# Patient Record
Sex: FEMALE | Race: BLACK OR AFRICAN AMERICAN | URBAN - METROPOLITAN AREA
[De-identification: names, ages, dates, MRNs, and addresses within clinical notes are randomized per-mention and may not be internally consistent; named-entity substitution may affect disease eponyms.]

---

## 2018-11-01 ENCOUNTER — APPOINTMENT (OUTPATIENT)
Dept: OTHER | Facility: HOSPITAL | Age: 43
End: 2018-11-01
Attending: FAMILY MEDICINE

## 2022-03-03 ENCOUNTER — APPOINTMENT (OUTPATIENT)
Dept: GENERAL RADIOLOGY | Facility: HOSPITAL | Age: 47
End: 2022-03-03
Attending: EMERGENCY MEDICINE
Payer: COMMERCIAL

## 2022-03-03 ENCOUNTER — HOSPITAL ENCOUNTER (EMERGENCY)
Facility: HOSPITAL | Age: 47
Discharge: HOME | End: 2022-03-03
Attending: EMERGENCY MEDICINE
Payer: COMMERCIAL

## 2022-03-03 VITALS
TEMPERATURE: 98 F | DIASTOLIC BLOOD PRESSURE: 87 MMHG | OXYGEN SATURATION: 95 % | SYSTOLIC BLOOD PRESSURE: 102 MMHG | WEIGHT: 220 LBS | RESPIRATION RATE: 18 BRPM | HEART RATE: 85 BPM

## 2022-03-03 DIAGNOSIS — J40 BRONCHITIS: Primary | ICD-10-CM

## 2022-03-03 LAB
ANION GAP SERPL CALC-SCNC: 8 MMOL/L (ref 0–18)
BASOPHILS # BLD AUTO: 0.09 X10(3) UL (ref 0–0.2)
BASOPHILS NFR BLD AUTO: 0.7 %
BUN BLD-MCNC: 9 MG/DL (ref 7–18)
BUN/CREAT SERPL: 10.8 (ref 10–20)
CALCIUM BLD-MCNC: 9 MG/DL (ref 8.5–10.1)
CHLORIDE SERPL-SCNC: 110 MMOL/L (ref 98–112)
CO2 SERPL-SCNC: 25 MMOL/L (ref 21–32)
CREAT BLD-MCNC: 0.83 MG/DL
D DIMER PPP FEU-MCNC: 0.48 UG/ML FEU (ref ?–0.5)
DEPRECATED RDW RBC AUTO: 40.4 FL (ref 35.1–46.3)
EOSINOPHIL # BLD AUTO: 0.07 X10(3) UL (ref 0–0.7)
EOSINOPHIL NFR BLD AUTO: 0.5 %
ERYTHROCYTE [DISTWIDTH] IN BLOOD BY AUTOMATED COUNT: 11.9 % (ref 11–15)
GLUCOSE BLD-MCNC: 100 MG/DL (ref 70–99)
HCT VFR BLD AUTO: 38.4 %
HGB BLD-MCNC: 12.8 G/DL
IMM GRANULOCYTES # BLD AUTO: 0.05 X10(3) UL (ref 0–1)
IMM GRANULOCYTES NFR BLD: 0.4 %
LYMPHOCYTES # BLD AUTO: 1.66 X10(3) UL (ref 1–4)
LYMPHOCYTES NFR BLD AUTO: 13 %
MCH RBC QN AUTO: 31.1 PG (ref 26–34)
MCHC RBC AUTO-ENTMCNC: 33.3 G/DL (ref 31–37)
MCV RBC AUTO: 93.2 FL
MONOCYTES # BLD AUTO: 1.18 X10(3) UL (ref 0.1–1)
MONOCYTES NFR BLD AUTO: 9.2 %
NEUTROPHILS # BLD AUTO: 9.73 X10 (3) UL (ref 1.5–7.7)
NEUTROPHILS # BLD AUTO: 9.73 X10(3) UL (ref 1.5–7.7)
NEUTROPHILS NFR BLD AUTO: 76.2 %
OSMOLALITY SERPL CALC.SUM OF ELEC: 295 MOSM/KG (ref 275–295)
PLATELET # BLD AUTO: 195 10(3)UL (ref 150–450)
POTASSIUM SERPL-SCNC: 3.9 MMOL/L (ref 3.5–5.1)
RBC # BLD AUTO: 4.12 X10(6)UL
SODIUM SERPL-SCNC: 143 MMOL/L (ref 136–145)
TROPONIN I HIGH SENSITIVITY: 5 NG/L
WBC # BLD AUTO: 12.8 X10(3) UL (ref 4–11)

## 2022-03-03 PROCEDURE — 99284 EMERGENCY DEPT VISIT MOD MDM: CPT

## 2022-03-03 PROCEDURE — 96374 THER/PROPH/DIAG INJ IV PUSH: CPT

## 2022-03-03 PROCEDURE — 80048 BASIC METABOLIC PNL TOTAL CA: CPT | Performed by: EMERGENCY MEDICINE

## 2022-03-03 PROCEDURE — 93005 ELECTROCARDIOGRAM TRACING: CPT

## 2022-03-03 PROCEDURE — 85025 COMPLETE CBC W/AUTO DIFF WBC: CPT | Performed by: EMERGENCY MEDICINE

## 2022-03-03 PROCEDURE — 71045 X-RAY EXAM CHEST 1 VIEW: CPT | Performed by: EMERGENCY MEDICINE

## 2022-03-03 PROCEDURE — 93010 ELECTROCARDIOGRAM REPORT: CPT | Performed by: EMERGENCY MEDICINE

## 2022-03-03 PROCEDURE — 85379 FIBRIN DEGRADATION QUANT: CPT | Performed by: EMERGENCY MEDICINE

## 2022-03-03 PROCEDURE — 94640 AIRWAY INHALATION TREATMENT: CPT

## 2022-03-03 PROCEDURE — 84484 ASSAY OF TROPONIN QUANT: CPT | Performed by: EMERGENCY MEDICINE

## 2022-03-03 RX ORDER — ALBUTEROL SULFATE 2.5 MG/3ML
SOLUTION RESPIRATORY (INHALATION)
Status: COMPLETED
Start: 2022-03-03 | End: 2022-03-03

## 2022-03-03 RX ORDER — ALBUTEROL SULFATE 90 UG/1
2 AEROSOL, METERED RESPIRATORY (INHALATION) 4 TIMES DAILY
Status: DISCONTINUED | OUTPATIENT
Start: 2022-03-03 | End: 2022-03-03

## 2022-03-03 RX ORDER — METHYLPREDNISOLONE SODIUM SUCCINATE 125 MG/2ML
60 INJECTION, POWDER, LYOPHILIZED, FOR SOLUTION INTRAMUSCULAR; INTRAVENOUS ONCE
Status: COMPLETED | OUTPATIENT
Start: 2022-03-03 | End: 2022-03-03

## 2022-03-03 RX ORDER — DOXYCYCLINE HYCLATE 100 MG/1
100 CAPSULE ORAL 2 TIMES DAILY
Qty: 10 CAPSULE | Refills: 0 | Status: SHIPPED | OUTPATIENT
Start: 2022-03-03 | End: 2022-03-08

## 2022-03-03 RX ORDER — PREDNISONE 20 MG/1
40 TABLET ORAL DAILY
Qty: 6 TABLET | Refills: 0 | Status: SHIPPED | OUTPATIENT
Start: 2022-03-03 | End: 2022-03-06

## 2022-03-03 RX ORDER — ALBUTEROL SULFATE 2.5 MG/3ML
2.5 SOLUTION RESPIRATORY (INHALATION) ONCE
Status: COMPLETED | OUTPATIENT
Start: 2022-03-03 | End: 2022-03-03

## 2022-03-03 RX ORDER — ALBUTEROL SULFATE 90 UG/1
2 AEROSOL, METERED RESPIRATORY (INHALATION) EVERY 4 HOURS PRN
Qty: 1 EACH | Refills: 0 | Status: SHIPPED | OUTPATIENT
Start: 2022-03-03 | End: 2022-04-02

## 2022-03-03 NOTE — ED QUICK NOTES
Pt A&OX4, pt denies dizziness/lightheadedness, cp, sob, n/v. Discharge paperwork and prescriptions discussed with pt, pt verbally understands them. Pt discharged ambulatory with steady gait - no distress noted.

## 2022-08-01 ENCOUNTER — OFFICE VISIT (OUTPATIENT)
Dept: FAMILY MEDICINE CLINIC | Facility: CLINIC | Age: 47
End: 2022-08-01
Payer: COMMERCIAL

## 2022-08-01 VITALS
HEIGHT: 64 IN | DIASTOLIC BLOOD PRESSURE: 77 MMHG | HEART RATE: 91 BPM | WEIGHT: 235 LBS | BODY MASS INDEX: 40.12 KG/M2 | SYSTOLIC BLOOD PRESSURE: 115 MMHG

## 2022-08-01 DIAGNOSIS — Z82.69 FAMILY HISTORY OF SYSTEMIC LUPUS ERYTHEMATOSUS: ICD-10-CM

## 2022-08-01 DIAGNOSIS — N63.22 MASS OF UPPER INNER QUADRANT OF LEFT BREAST: ICD-10-CM

## 2022-08-01 DIAGNOSIS — E07.89 THYROID MASS OF UNCLEAR ETIOLOGY: ICD-10-CM

## 2022-08-01 DIAGNOSIS — Z12.31 ENCOUNTER FOR SCREENING MAMMOGRAM FOR MALIGNANT NEOPLASM OF BREAST: ICD-10-CM

## 2022-08-01 DIAGNOSIS — J45.20 MILD INTERMITTENT ASTHMA, UNSPECIFIED WHETHER COMPLICATED: Primary | ICD-10-CM

## 2022-08-01 DIAGNOSIS — R63.5 WEIGHT GAIN: ICD-10-CM

## 2022-08-01 DIAGNOSIS — E66.01 CLASS 3 SEVERE OBESITY WITHOUT SERIOUS COMORBIDITY WITH BODY MASS INDEX (BMI) OF 40.0 TO 44.9 IN ADULT, UNSPECIFIED OBESITY TYPE (HCC): ICD-10-CM

## 2022-08-01 PROBLEM — E66.813 CLASS 3 SEVERE OBESITY WITHOUT SERIOUS COMORBIDITY WITH BODY MASS INDEX (BMI) OF 40.0 TO 44.9 IN ADULT: Status: ACTIVE | Noted: 2022-08-01

## 2022-08-01 PROBLEM — E66.813 CLASS 3 SEVERE OBESITY WITHOUT SERIOUS COMORBIDITY WITH BODY MASS INDEX (BMI) OF 40.0 TO 44.9 IN ADULT (HCC): Status: ACTIVE | Noted: 2022-08-01

## 2022-08-01 PROCEDURE — 3008F BODY MASS INDEX DOCD: CPT

## 2022-08-01 PROCEDURE — 3078F DIAST BP <80 MM HG: CPT

## 2022-08-01 PROCEDURE — 3074F SYST BP LT 130 MM HG: CPT

## 2022-08-01 PROCEDURE — 99203 OFFICE O/P NEW LOW 30 MIN: CPT

## 2022-08-01 RX ORDER — ALBUTEROL SULFATE 90 UG/1
2 AEROSOL, METERED RESPIRATORY (INHALATION) EVERY 4 HOURS PRN
Qty: 18 G | Refills: 2 | Status: SHIPPED | OUTPATIENT
Start: 2022-08-01

## 2022-08-01 RX ORDER — BUDESONIDE AND FORMOTEROL FUMARATE DIHYDRATE 160; 4.5 UG/1; UG/1
2 AEROSOL RESPIRATORY (INHALATION) 2 TIMES DAILY
COMMUNITY
Start: 2022-03-03 | End: 2022-08-01

## 2022-08-01 RX ORDER — SODIUM FLUORIDE 5 MG/ML
PASTE, DENTIFRICE DENTAL
COMMUNITY
Start: 2022-02-05

## 2022-08-01 RX ORDER — MONTELUKAST SODIUM 10 MG/1
10 TABLET ORAL DAILY
Qty: 90 TABLET | Refills: 0 | Status: SHIPPED | OUTPATIENT
Start: 2022-08-01

## 2022-08-01 RX ORDER — CYCLOBENZAPRINE HCL 10 MG
10 TABLET ORAL 3 TIMES DAILY
COMMUNITY
Start: 2022-03-11 | End: 2022-08-01

## 2022-08-01 RX ORDER — MONTELUKAST SODIUM 10 MG/1
10 TABLET ORAL DAILY
COMMUNITY
Start: 2022-03-06 | End: 2022-08-01

## 2022-08-01 RX ORDER — SUMATRIPTAN 50 MG/1
TABLET, FILM COATED ORAL
COMMUNITY
Start: 2022-03-11

## 2022-08-01 RX ORDER — CHLORHEXIDINE GLUCONATE 0.12 MG/ML
RINSE ORAL
COMMUNITY
Start: 2022-02-05

## 2022-08-01 RX ORDER — CYCLOBENZAPRINE HCL 10 MG
10 TABLET ORAL 3 TIMES DAILY
Qty: 42 TABLET | Refills: 0 | Status: SHIPPED | OUTPATIENT
Start: 2022-08-01

## 2022-08-01 RX ORDER — BUDESONIDE AND FORMOTEROL FUMARATE DIHYDRATE 160; 4.5 UG/1; UG/1
2 AEROSOL RESPIRATORY (INHALATION) 2 TIMES DAILY
Qty: 10.2 G | Refills: 2 | Status: SHIPPED | OUTPATIENT
Start: 2022-08-01

## 2022-08-01 RX ORDER — PREDNISONE 20 MG/1
TABLET ORAL
Qty: 12 TABLET | Refills: 0 | Status: SHIPPED | OUTPATIENT
Start: 2022-08-01

## 2022-08-03 ENCOUNTER — PATIENT MESSAGE (OUTPATIENT)
Dept: FAMILY MEDICINE CLINIC | Facility: CLINIC | Age: 47
End: 2022-08-03

## 2022-08-03 PROBLEM — E07.89 THYROID MASS OF UNCLEAR ETIOLOGY: Status: ACTIVE | Noted: 2022-08-03

## 2022-08-04 ENCOUNTER — LAB ENCOUNTER (OUTPATIENT)
Dept: LAB | Age: 47
End: 2022-08-04
Payer: COMMERCIAL

## 2022-08-04 DIAGNOSIS — Z82.69 FAMILY HISTORY OF SYSTEMIC LUPUS ERYTHEMATOSUS: ICD-10-CM

## 2022-08-04 DIAGNOSIS — E66.01 CLASS 3 SEVERE OBESITY WITHOUT SERIOUS COMORBIDITY WITH BODY MASS INDEX (BMI) OF 40.0 TO 44.9 IN ADULT, UNSPECIFIED OBESITY TYPE (HCC): ICD-10-CM

## 2022-08-04 LAB
ALBUMIN SERPL-MCNC: 3.4 G/DL (ref 3.4–5)
ALBUMIN/GLOB SERPL: 0.8 {RATIO} (ref 1–2)
ALP LIVER SERPL-CCNC: 68 U/L
ALT SERPL-CCNC: 18 U/L
ANION GAP SERPL CALC-SCNC: 9 MMOL/L (ref 0–18)
AST SERPL-CCNC: 9 U/L (ref 15–37)
BASOPHILS # BLD AUTO: 0.1 X10(3) UL (ref 0–0.2)
BASOPHILS NFR BLD AUTO: 1.4 %
BILIRUB SERPL-MCNC: 0.4 MG/DL (ref 0.1–2)
BUN BLD-MCNC: 10 MG/DL (ref 7–18)
CALCIUM BLD-MCNC: 9 MG/DL (ref 8.5–10.1)
CHLORIDE SERPL-SCNC: 113 MMOL/L (ref 98–112)
CHOLEST SERPL-MCNC: 142 MG/DL (ref ?–200)
CO2 SERPL-SCNC: 20 MMOL/L (ref 21–32)
CREAT BLD-MCNC: 0.88 MG/DL
EOSINOPHIL # BLD AUTO: 0.19 X10(3) UL (ref 0–0.7)
EOSINOPHIL NFR BLD AUTO: 2.6 %
ERYTHROCYTE [DISTWIDTH] IN BLOOD BY AUTOMATED COUNT: 12.3 %
GFR SERPLBLD BASED ON 1.73 SQ M-ARVRAT: 82 ML/MIN/1.73M2 (ref 60–?)
GLOBULIN PLAS-MCNC: 4.3 G/DL (ref 2.8–4.4)
GLUCOSE BLD-MCNC: 102 MG/DL (ref 70–99)
HCT VFR BLD AUTO: 38.8 %
HDLC SERPL-MCNC: 49 MG/DL (ref 40–59)
HGB BLD-MCNC: 12.4 G/DL
IMM GRANULOCYTES # BLD AUTO: 0.04 X10(3) UL (ref 0–1)
IMM GRANULOCYTES NFR BLD: 0.6 %
LDLC SERPL CALC-MCNC: 82 MG/DL (ref ?–100)
LYMPHOCYTES # BLD AUTO: 2.19 X10(3) UL (ref 1–4)
LYMPHOCYTES NFR BLD AUTO: 30.2 %
MCH RBC QN AUTO: 29.5 PG (ref 26–34)
MCHC RBC AUTO-ENTMCNC: 32 G/DL (ref 31–37)
MCV RBC AUTO: 92.4 FL
MONOCYTES # BLD AUTO: 0.8 X10(3) UL (ref 0.1–1)
MONOCYTES NFR BLD AUTO: 11 %
NEUTROPHILS # BLD AUTO: 3.94 X10 (3) UL (ref 1.5–7.7)
NEUTROPHILS # BLD AUTO: 3.94 X10(3) UL (ref 1.5–7.7)
NEUTROPHILS NFR BLD AUTO: 54.2 %
NONHDLC SERPL-MCNC: 93 MG/DL (ref ?–130)
OSMOLALITY SERPL CALC.SUM OF ELEC: 293 MOSM/KG (ref 275–295)
PLATELET # BLD AUTO: 216 10(3)UL (ref 150–450)
POTASSIUM SERPL-SCNC: 4 MMOL/L (ref 3.5–5.1)
PROT SERPL-MCNC: 7.7 G/DL (ref 6.4–8.2)
RBC # BLD AUTO: 4.2 X10(6)UL
SODIUM SERPL-SCNC: 142 MMOL/L (ref 136–145)
TRIGL SERPL-MCNC: 47 MG/DL (ref 30–149)
TSI SER-ACNC: 0.52 MIU/ML (ref 0.36–3.74)
VLDLC SERPL CALC-MCNC: 7 MG/DL (ref 0–30)
WBC # BLD AUTO: 7.3 X10(3) UL (ref 4–11)

## 2022-08-04 PROCEDURE — 84443 ASSAY THYROID STIM HORMONE: CPT

## 2022-08-04 PROCEDURE — 80061 LIPID PANEL: CPT

## 2022-08-04 PROCEDURE — 86038 ANTINUCLEAR ANTIBODIES: CPT

## 2022-08-04 PROCEDURE — 80053 COMPREHEN METABOLIC PANEL: CPT

## 2022-08-04 PROCEDURE — 85025 COMPLETE CBC W/AUTO DIFF WBC: CPT

## 2022-08-04 PROCEDURE — 36415 COLL VENOUS BLD VENIPUNCTURE: CPT

## 2022-08-10 PROBLEM — R94.8 ABNORMAL METABOLIC FUNCTION: Status: ACTIVE | Noted: 2022-08-10

## 2022-08-10 PROBLEM — R73.9 HIGH BLOOD SUGAR: Status: ACTIVE | Noted: 2022-08-10

## 2022-08-15 ENCOUNTER — LAB ENCOUNTER (OUTPATIENT)
Dept: LAB | Age: 47
End: 2022-08-15
Payer: COMMERCIAL

## 2022-08-15 ENCOUNTER — HOSPITAL ENCOUNTER (OUTPATIENT)
Dept: ULTRASOUND IMAGING | Age: 47
Discharge: HOME | End: 2022-08-15
Payer: COMMERCIAL

## 2022-08-15 DIAGNOSIS — R94.8 ABNORMAL METABOLIC FUNCTION: ICD-10-CM

## 2022-08-15 DIAGNOSIS — R73.9 HIGH BLOOD SUGAR: ICD-10-CM

## 2022-08-15 DIAGNOSIS — E07.89 THYROID MASS OF UNCLEAR ETIOLOGY: ICD-10-CM

## 2022-08-15 LAB
ALBUMIN SERPL-MCNC: 3.5 G/DL (ref 3.4–5)
ALBUMIN/GLOB SERPL: 0.8 {RATIO} (ref 1–2)
ALP LIVER SERPL-CCNC: 67 U/L
ALT SERPL-CCNC: 19 U/L
ANION GAP SERPL CALC-SCNC: 3 MMOL/L (ref 0–18)
AST SERPL-CCNC: 11 U/L (ref 15–37)
BILIRUB SERPL-MCNC: 0.3 MG/DL (ref 0.1–2)
BUN BLD-MCNC: 15 MG/DL (ref 7–18)
CALCIUM BLD-MCNC: 8.5 MG/DL (ref 8.5–10.1)
CHLORIDE SERPL-SCNC: 111 MMOL/L (ref 98–112)
CO2 SERPL-SCNC: 25 MMOL/L (ref 21–32)
CREAT BLD-MCNC: 1.05 MG/DL
EST. AVERAGE GLUCOSE BLD GHB EST-MCNC: 111 MG/DL (ref 68–126)
FASTING STATUS PATIENT QL REPORTED: YES
GFR SERPLBLD BASED ON 1.73 SQ M-ARVRAT: 66 ML/MIN/1.73M2 (ref 60–?)
GLOBULIN PLAS-MCNC: 4.2 G/DL (ref 2.8–4.4)
GLUCOSE BLD-MCNC: 90 MG/DL (ref 70–99)
HBA1C MFR BLD: 5.5 % (ref ?–5.7)
OSMOLALITY SERPL CALC.SUM OF ELEC: 288 MOSM/KG (ref 275–295)
POTASSIUM SERPL-SCNC: 3.4 MMOL/L (ref 3.5–5.1)
PROT SERPL-MCNC: 7.7 G/DL (ref 6.4–8.2)
SODIUM SERPL-SCNC: 139 MMOL/L (ref 136–145)

## 2022-08-15 PROCEDURE — 83036 HEMOGLOBIN GLYCOSYLATED A1C: CPT

## 2022-08-15 PROCEDURE — 36415 COLL VENOUS BLD VENIPUNCTURE: CPT

## 2022-08-15 PROCEDURE — 80053 COMPREHEN METABOLIC PANEL: CPT

## 2022-08-15 PROCEDURE — 76536 US EXAM OF HEAD AND NECK: CPT

## 2022-08-16 ENCOUNTER — HOSPITAL ENCOUNTER (OUTPATIENT)
Dept: ULTRASOUND IMAGING | Age: 47
Discharge: HOME | End: 2022-08-16
Payer: COMMERCIAL

## 2022-08-16 ENCOUNTER — HOSPITAL ENCOUNTER (OUTPATIENT)
Dept: MAMMOGRAPHY | Age: 47
Discharge: HOME | End: 2022-08-16
Payer: COMMERCIAL

## 2022-08-16 DIAGNOSIS — Z12.31 ENCOUNTER FOR SCREENING MAMMOGRAM FOR MALIGNANT NEOPLASM OF BREAST: ICD-10-CM

## 2022-08-16 DIAGNOSIS — N63.22 MASS OF UPPER INNER QUADRANT OF LEFT BREAST: ICD-10-CM

## 2022-08-16 PROCEDURE — 77066 DX MAMMO INCL CAD BI: CPT

## 2022-08-16 PROCEDURE — 76641 ULTRASOUND BREAST COMPLETE: CPT

## 2022-08-16 PROCEDURE — 77062 BREAST TOMOSYNTHESIS BI: CPT

## 2022-08-19 ENCOUNTER — LAB ENCOUNTER (OUTPATIENT)
Dept: LAB | Age: 47
End: 2022-08-19
Payer: COMMERCIAL

## 2022-08-19 ENCOUNTER — OFFICE VISIT (OUTPATIENT)
Dept: FAMILY MEDICINE CLINIC | Facility: CLINIC | Age: 47
End: 2022-08-19
Payer: COMMERCIAL

## 2022-08-19 VITALS
TEMPERATURE: 98 F | SYSTOLIC BLOOD PRESSURE: 114 MMHG | HEIGHT: 64 IN | BODY MASS INDEX: 39.95 KG/M2 | RESPIRATION RATE: 18 BRPM | OXYGEN SATURATION: 97 % | WEIGHT: 234 LBS | HEART RATE: 88 BPM | DIASTOLIC BLOOD PRESSURE: 75 MMHG

## 2022-08-19 DIAGNOSIS — J45.20 MILD INTERMITTENT ASTHMA, UNSPECIFIED WHETHER COMPLICATED: Primary | ICD-10-CM

## 2022-08-19 DIAGNOSIS — J02.9 SORE THROAT: ICD-10-CM

## 2022-08-19 DIAGNOSIS — E06.9 THYROIDITIS: ICD-10-CM

## 2022-08-19 DIAGNOSIS — Z30.42 ENCOUNTER FOR DEPO-PROVERA CONTRACEPTION: ICD-10-CM

## 2022-08-19 LAB
CONTROL LINE PRESENT WITH A CLEAR BACKGROUND (YES/NO): YES YES/NO
PREGNANCY TEST, URINE: NEGATIVE
THYROPEROXIDASE AB SERPL-ACNC: 29 U/ML (ref ?–60)

## 2022-08-19 PROCEDURE — 36415 COLL VENOUS BLD VENIPUNCTURE: CPT

## 2022-08-19 PROCEDURE — 3078F DIAST BP <80 MM HG: CPT

## 2022-08-19 PROCEDURE — 3008F BODY MASS INDEX DOCD: CPT

## 2022-08-19 PROCEDURE — 3074F SYST BP LT 130 MM HG: CPT

## 2022-08-19 PROCEDURE — 96372 THER/PROPH/DIAG INJ SC/IM: CPT

## 2022-08-19 PROCEDURE — 86376 MICROSOMAL ANTIBODY EACH: CPT

## 2022-08-19 PROCEDURE — 99213 OFFICE O/P EST LOW 20 MIN: CPT

## 2022-08-19 PROCEDURE — 81025 URINE PREGNANCY TEST: CPT

## 2022-08-19 RX ORDER — PREDNISONE 20 MG/1
40 TABLET ORAL DAILY
Qty: 6 TABLET | Refills: 0 | Status: SHIPPED | OUTPATIENT
Start: 2022-08-19 | End: 2022-08-22

## 2022-08-19 NOTE — PATIENT INSTRUCTIONS
Take Allegra or Zyrtec daily   Take Prednisone 20 mg two tablets daily x 3 days.  Call office once done if breathing not improved  Wait to take chest x-ray on Monday if breathing not improving     Return to office in 3 months for second Depo- Provera injection

## 2022-09-26 ENCOUNTER — OFFICE VISIT (OUTPATIENT)
Dept: ENDOCRINOLOGY CLINIC | Facility: CLINIC | Age: 47
End: 2022-09-26

## 2022-09-26 VITALS
BODY MASS INDEX: 38.07 KG/M2 | HEIGHT: 64 IN | WEIGHT: 223 LBS | DIASTOLIC BLOOD PRESSURE: 67 MMHG | HEART RATE: 90 BPM | RESPIRATION RATE: 16 BRPM | SYSTOLIC BLOOD PRESSURE: 124 MMHG

## 2022-09-26 DIAGNOSIS — E04.9 GOITER: Primary | ICD-10-CM

## 2022-09-26 PROCEDURE — 3074F SYST BP LT 130 MM HG: CPT | Performed by: INTERNAL MEDICINE

## 2022-09-26 PROCEDURE — 3008F BODY MASS INDEX DOCD: CPT | Performed by: INTERNAL MEDICINE

## 2022-09-26 PROCEDURE — 3078F DIAST BP <80 MM HG: CPT | Performed by: INTERNAL MEDICINE

## 2022-09-26 PROCEDURE — 99244 OFF/OP CNSLTJ NEW/EST MOD 40: CPT | Performed by: INTERNAL MEDICINE

## 2022-09-27 PROBLEM — E04.9 GOITER: Status: ACTIVE | Noted: 2022-09-27

## 2022-11-05 ENCOUNTER — OFFICE VISIT (OUTPATIENT)
Dept: FAMILY MEDICINE CLINIC | Facility: CLINIC | Age: 47
End: 2022-11-05
Payer: COMMERCIAL

## 2022-11-05 VITALS
SYSTOLIC BLOOD PRESSURE: 109 MMHG | RESPIRATION RATE: 18 BRPM | HEIGHT: 64 IN | DIASTOLIC BLOOD PRESSURE: 72 MMHG | WEIGHT: 224 LBS | OXYGEN SATURATION: 98 % | TEMPERATURE: 98 F | HEART RATE: 76 BPM | BODY MASS INDEX: 38.24 KG/M2

## 2022-11-05 DIAGNOSIS — J45.20 MILD INTERMITTENT ASTHMA, UNSPECIFIED WHETHER COMPLICATED: ICD-10-CM

## 2022-11-05 DIAGNOSIS — Z30.42 ENCOUNTER FOR DEPO-PROVERA CONTRACEPTION: Primary | ICD-10-CM

## 2022-11-05 DIAGNOSIS — K04.7 TOOTH INFECTION: ICD-10-CM

## 2022-11-05 PROBLEM — E66.9 OBESITY (BMI 30-39.9): Status: ACTIVE | Noted: 2018-08-15

## 2022-11-05 LAB
CONTROL LINE PRESENT WITH A CLEAR BACKGROUND (YES/NO): YES YES/NO
KIT LOT #: NORMAL NUMERIC
PREGNANCY TEST, URINE: NEGATIVE

## 2022-11-05 PROCEDURE — 3078F DIAST BP <80 MM HG: CPT

## 2022-11-05 PROCEDURE — 96372 THER/PROPH/DIAG INJ SC/IM: CPT

## 2022-11-05 PROCEDURE — 3008F BODY MASS INDEX DOCD: CPT

## 2022-11-05 PROCEDURE — 99213 OFFICE O/P EST LOW 20 MIN: CPT

## 2022-11-05 PROCEDURE — 81025 URINE PREGNANCY TEST: CPT

## 2022-11-05 PROCEDURE — 3074F SYST BP LT 130 MM HG: CPT

## 2022-11-05 RX ORDER — AMOXICILLIN 500 MG/1
500 CAPSULE ORAL 2 TIMES DAILY
Qty: 20 CAPSULE | Refills: 0 | Status: SHIPPED | OUTPATIENT
Start: 2022-11-05 | End: 2022-11-15

## 2022-11-05 RX ORDER — MEDROXYPROGESTERONE ACETATE 150 MG/ML
150 INJECTION, SUSPENSION INTRAMUSCULAR ONCE
Status: COMPLETED | OUTPATIENT
Start: 2022-11-05 | End: 2022-11-05

## 2022-11-05 RX ORDER — BUDESONIDE AND FORMOTEROL FUMARATE DIHYDRATE 160; 4.5 UG/1; UG/1
2 AEROSOL RESPIRATORY (INHALATION) 2 TIMES DAILY
Qty: 10.2 G | Refills: 2 | Status: SHIPPED | OUTPATIENT
Start: 2022-11-05

## 2022-11-05 RX ADMIN — MEDROXYPROGESTERONE ACETATE 150 MG: 150 INJECTION, SUSPENSION INTRAMUSCULAR at 09:52:00

## 2022-11-13 PROBLEM — K04.7 TOOTH INFECTION: Status: ACTIVE | Noted: 2022-11-13

## 2023-02-04 ENCOUNTER — OFFICE VISIT (OUTPATIENT)
Dept: FAMILY MEDICINE CLINIC | Facility: CLINIC | Age: 48
End: 2023-02-04

## 2023-02-04 VITALS
WEIGHT: 226 LBS | TEMPERATURE: 98 F | OXYGEN SATURATION: 98 % | RESPIRATION RATE: 22 BRPM | SYSTOLIC BLOOD PRESSURE: 124 MMHG | DIASTOLIC BLOOD PRESSURE: 72 MMHG | BODY MASS INDEX: 38.58 KG/M2 | HEART RATE: 96 BPM | HEIGHT: 64 IN

## 2023-02-04 DIAGNOSIS — F41.9 ANXIETY: ICD-10-CM

## 2023-02-04 DIAGNOSIS — Z30.42 ENCOUNTER FOR DEPO-PROVERA CONTRACEPTION: Primary | ICD-10-CM

## 2023-02-04 DIAGNOSIS — J45.21 MILD INTERMITTENT ASTHMA WITH ACUTE EXACERBATION: ICD-10-CM

## 2023-02-04 DIAGNOSIS — M54.9 ACUTE MIDLINE BACK PAIN, UNSPECIFIED BACK LOCATION: ICD-10-CM

## 2023-02-04 RX ORDER — HYDROXYZINE 50 MG/1
50 TABLET, FILM COATED ORAL NIGHTLY PRN
Qty: 30 TABLET | Refills: 0 | Status: SHIPPED | OUTPATIENT
Start: 2023-02-04

## 2023-02-04 RX ORDER — PREDNISONE 20 MG/1
TABLET ORAL
Qty: 12 TABLET | Refills: 0 | Status: SHIPPED | OUTPATIENT
Start: 2023-02-04

## 2023-02-04 RX ORDER — CYCLOBENZAPRINE HCL 10 MG
10 TABLET ORAL 3 TIMES DAILY
Qty: 15 TABLET | Refills: 0 | Status: SHIPPED | OUTPATIENT
Start: 2023-02-04

## 2023-02-04 RX ORDER — MEDROXYPROGESTERONE ACETATE 150 MG/ML
150 INJECTION, SUSPENSION INTRAMUSCULAR ONCE
Status: COMPLETED | OUTPATIENT
Start: 2023-02-04 | End: 2023-02-04

## 2023-02-04 RX ORDER — AMOXICILLIN 500 MG/1
500 CAPSULE ORAL 3 TIMES DAILY
COMMUNITY
Start: 2023-01-26

## 2023-02-04 RX ADMIN — MEDROXYPROGESTERONE ACETATE 150 MG: 150 INJECTION, SUSPENSION INTRAMUSCULAR at 08:45:00

## 2023-02-21 ENCOUNTER — PATIENT OUTREACH (OUTPATIENT)
Dept: FAMILY MEDICINE CLINIC | Facility: CLINIC | Age: 48
End: 2023-02-21

## 2023-03-28 ENCOUNTER — APPOINTMENT (OUTPATIENT)
Dept: GENERAL RADIOLOGY | Age: 48
End: 2023-03-28
Attending: NURSE PRACTITIONER
Payer: COMMERCIAL

## 2023-03-28 ENCOUNTER — HOSPITAL ENCOUNTER (OUTPATIENT)
Age: 48
Discharge: HOME | End: 2023-03-28
Payer: COMMERCIAL

## 2023-03-28 VITALS
OXYGEN SATURATION: 98 % | WEIGHT: 220 LBS | DIASTOLIC BLOOD PRESSURE: 67 MMHG | SYSTOLIC BLOOD PRESSURE: 142 MMHG | HEART RATE: 89 BPM | BODY MASS INDEX: 37.56 KG/M2 | TEMPERATURE: 98 F | HEIGHT: 64 IN | RESPIRATION RATE: 18 BRPM

## 2023-03-28 DIAGNOSIS — J40 BRONCHITIS: Primary | ICD-10-CM

## 2023-03-28 PROCEDURE — 99213 OFFICE O/P EST LOW 20 MIN: CPT

## 2023-03-28 PROCEDURE — 71046 X-RAY EXAM CHEST 2 VIEWS: CPT | Performed by: NURSE PRACTITIONER

## 2023-03-28 PROCEDURE — 99204 OFFICE O/P NEW MOD 45 MIN: CPT

## 2023-03-28 RX ORDER — PREDNISONE 20 MG/1
20 TABLET ORAL 2 TIMES DAILY
Qty: 10 TABLET | Refills: 0 | Status: SHIPPED | OUTPATIENT
Start: 2023-03-28 | End: 2023-04-02

## 2023-03-28 RX ORDER — BENZONATATE 100 MG/1
100 CAPSULE ORAL 3 TIMES DAILY PRN
Qty: 30 CAPSULE | Refills: 0 | Status: SHIPPED | OUTPATIENT
Start: 2023-03-28 | End: 2023-04-27

## 2023-03-28 NOTE — ED INITIAL ASSESSMENT (HPI)
C/o cough since yesterday, coughing-up yellowish green mucous, some crackles in the chest. Negative Covid test thru work today. No fever.

## 2023-03-28 NOTE — DISCHARGE INSTRUCTIONS
Follow-up with your primary care physician in one week if symptoms have not improved or symptoms are starting to get worse. Increase fluids, keep well-hydrated. Take Tylenol and Motrin for fever and pain. Use the steroids twice daily for 5 days use the benzoate every 8 hours as needed  Continue using your inhalers at home  Return to the emergency room for worse symptoms or concerns.

## 2023-03-29 ENCOUNTER — TELEPHONE (OUTPATIENT)
Dept: FAMILY MEDICINE CLINIC | Facility: CLINIC | Age: 48
End: 2023-03-29

## 2023-03-29 NOTE — TELEPHONE ENCOUNTER
Talked to patient told her message below understood and she's going to make the appointment thru her MyChart.

## 2023-10-16 ENCOUNTER — OFFICE VISIT (OUTPATIENT)
Dept: FAMILY MEDICINE CLINIC | Facility: CLINIC | Age: 48
End: 2023-10-16

## 2023-10-16 ENCOUNTER — LAB ENCOUNTER (OUTPATIENT)
Dept: LAB | Age: 48
End: 2023-10-16
Attending: STUDENT IN AN ORGANIZED HEALTH CARE EDUCATION/TRAINING PROGRAM
Payer: MEDICAID

## 2023-10-16 VITALS
HEIGHT: 64 IN | DIASTOLIC BLOOD PRESSURE: 80 MMHG | WEIGHT: 225 LBS | OXYGEN SATURATION: 99 % | TEMPERATURE: 98 F | RESPIRATION RATE: 18 BRPM | HEART RATE: 91 BPM | SYSTOLIC BLOOD PRESSURE: 122 MMHG | BODY MASS INDEX: 38.41 KG/M2

## 2023-10-16 DIAGNOSIS — Z00.00 ROUTINE PHYSICAL EXAMINATION: Primary | ICD-10-CM

## 2023-10-16 DIAGNOSIS — J45.20 MILD INTERMITTENT ASTHMA WITHOUT COMPLICATION: ICD-10-CM

## 2023-10-16 DIAGNOSIS — N89.8 VAGINAL DISCHARGE: ICD-10-CM

## 2023-10-16 DIAGNOSIS — E66.01 CLASS 3 SEVERE OBESITY WITHOUT SERIOUS COMORBIDITY WITH BODY MASS INDEX (BMI) OF 40.0 TO 44.9 IN ADULT, UNSPECIFIED OBESITY TYPE (HCC): ICD-10-CM

## 2023-10-16 DIAGNOSIS — F41.9 ANXIETY: ICD-10-CM

## 2023-10-16 DIAGNOSIS — N92.6 MENSTRUAL ABNORMALITY: ICD-10-CM

## 2023-10-16 DIAGNOSIS — Z01.419 ENCOUNTER FOR GYNECOLOGICAL EXAMINATION: ICD-10-CM

## 2023-10-16 DIAGNOSIS — Z12.31 ENCOUNTER FOR SCREENING MAMMOGRAM FOR MALIGNANT NEOPLASM OF BREAST: ICD-10-CM

## 2023-10-16 DIAGNOSIS — Z30.42 ENCOUNTER FOR DEPO-PROVERA CONTRACEPTION: ICD-10-CM

## 2023-10-16 DIAGNOSIS — Z12.11 COLON CANCER SCREENING: ICD-10-CM

## 2023-10-16 DIAGNOSIS — Z00.00 ROUTINE PHYSICAL EXAMINATION: ICD-10-CM

## 2023-10-16 LAB
BASOPHILS # BLD AUTO: 0.06 X10(3) UL (ref 0–0.2)
BASOPHILS NFR BLD AUTO: 0.6 %
CONTROL LINE PRESENT WITH A CLEAR BACKGROUND (YES/NO): YES YES/NO
DEPRECATED RDW RBC AUTO: 40.7 FL (ref 35.1–46.3)
EOSINOPHIL # BLD AUTO: 0.06 X10(3) UL (ref 0–0.7)
EOSINOPHIL NFR BLD AUTO: 0.6 %
ERYTHROCYTE [DISTWIDTH] IN BLOOD BY AUTOMATED COUNT: 12.4 % (ref 11–15)
HCT VFR BLD AUTO: 40.3 %
HGB BLD-MCNC: 12.8 G/DL
IMM GRANULOCYTES # BLD AUTO: 0.02 X10(3) UL (ref 0–1)
IMM GRANULOCYTES NFR BLD: 0.2 %
KIT LOT #: NORMAL NUMERIC
LYMPHOCYTES # BLD AUTO: 2.36 X10(3) UL (ref 1–4)
LYMPHOCYTES NFR BLD AUTO: 25.5 %
MCH RBC QN AUTO: 28.4 PG (ref 26–34)
MCHC RBC AUTO-ENTMCNC: 31.8 G/DL (ref 31–37)
MCV RBC AUTO: 89.4 FL
MONOCYTES # BLD AUTO: 0.62 X10(3) UL (ref 0.1–1)
MONOCYTES NFR BLD AUTO: 6.7 %
NEUTROPHILS # BLD AUTO: 6.13 X10 (3) UL (ref 1.5–7.7)
NEUTROPHILS # BLD AUTO: 6.13 X10(3) UL (ref 1.5–7.7)
NEUTROPHILS NFR BLD AUTO: 66.4 %
PLATELET # BLD AUTO: 252 10(3)UL (ref 150–450)
PREGNANCY TEST, URINE: NEGATIVE
RBC # BLD AUTO: 4.51 X10(6)UL
WBC # BLD AUTO: 9.3 X10(3) UL (ref 4–11)

## 2023-10-16 PROCEDURE — 3074F SYST BP LT 130 MM HG: CPT

## 2023-10-16 PROCEDURE — 80053 COMPREHEN METABOLIC PANEL: CPT

## 2023-10-16 PROCEDURE — 81025 URINE PREGNANCY TEST: CPT

## 2023-10-16 PROCEDURE — 84703 CHORIONIC GONADOTROPIN ASSAY: CPT

## 2023-10-16 PROCEDURE — 3079F DIAST BP 80-89 MM HG: CPT

## 2023-10-16 PROCEDURE — 36415 COLL VENOUS BLD VENIPUNCTURE: CPT

## 2023-10-16 PROCEDURE — 80061 LIPID PANEL: CPT

## 2023-10-16 PROCEDURE — 84443 ASSAY THYROID STIM HORMONE: CPT

## 2023-10-16 PROCEDURE — 96372 THER/PROPH/DIAG INJ SC/IM: CPT

## 2023-10-16 PROCEDURE — 3008F BODY MASS INDEX DOCD: CPT

## 2023-10-16 PROCEDURE — 99396 PREV VISIT EST AGE 40-64: CPT

## 2023-10-16 PROCEDURE — 85025 COMPLETE CBC W/AUTO DIFF WBC: CPT

## 2023-10-16 RX ORDER — PAROXETINE HYDROCHLORIDE 20 MG/1
20 TABLET, FILM COATED ORAL EVERY MORNING
Qty: 90 TABLET | Refills: 1 | Status: SHIPPED | OUTPATIENT
Start: 2023-10-16

## 2023-10-16 RX ORDER — MEDROXYPROGESTERONE ACETATE 150 MG/ML
150 INJECTION, SUSPENSION INTRAMUSCULAR ONCE
Status: COMPLETED | OUTPATIENT
Start: 2023-10-16 | End: 2023-10-16

## 2023-10-16 RX ADMIN — MEDROXYPROGESTERONE ACETATE 150 MG: 150 INJECTION, SUSPENSION INTRAMUSCULAR at 16:01:00

## 2023-10-17 ENCOUNTER — TELEPHONE (OUTPATIENT)
Dept: FAMILY MEDICINE CLINIC | Facility: CLINIC | Age: 48
End: 2023-10-17

## 2023-10-17 LAB
ALBUMIN SERPL-MCNC: 3.8 G/DL (ref 3.4–5)
ALBUMIN/GLOB SERPL: 0.9 {RATIO} (ref 1–2)
ALP LIVER SERPL-CCNC: 79 U/L
ALT SERPL-CCNC: 24 U/L
ANION GAP SERPL CALC-SCNC: 9 MMOL/L (ref 0–18)
AST SERPL-CCNC: 20 U/L (ref 15–37)
BILIRUB SERPL-MCNC: 0.3 MG/DL (ref 0.1–2)
BUN BLD-MCNC: 8 MG/DL (ref 7–18)
BUN/CREAT SERPL: 8.7 (ref 10–20)
C TRACH DNA SPEC QL NAA+PROBE: NEGATIVE
CALCIUM BLD-MCNC: 8.9 MG/DL (ref 8.5–10.1)
CHLORIDE SERPL-SCNC: 109 MMOL/L (ref 98–112)
CHOLEST SERPL-MCNC: 164 MG/DL (ref ?–200)
CO2 SERPL-SCNC: 24 MMOL/L (ref 21–32)
CREAT BLD-MCNC: 0.92 MG/DL
EGFRCR SERPLBLD CKD-EPI 2021: 77 ML/MIN/1.73M2 (ref 60–?)
FASTING PATIENT LIPID ANSWER: YES
FASTING STATUS PATIENT QL REPORTED: YES
GLOBULIN PLAS-MCNC: 4.2 G/DL (ref 2.8–4.4)
GLUCOSE BLD-MCNC: 87 MG/DL (ref 70–99)
HCG SERPL QL: NEGATIVE
HDLC SERPL-MCNC: 47 MG/DL (ref 40–59)
HPV I/H RISK 1 DNA SPEC QL NAA+PROBE: NEGATIVE
LDLC SERPL CALC-MCNC: 105 MG/DL (ref ?–100)
N GONORRHOEA DNA SPEC QL NAA+PROBE: NEGATIVE
NONHDLC SERPL-MCNC: 117 MG/DL (ref ?–130)
OSMOLALITY SERPL CALC.SUM OF ELEC: 292 MOSM/KG (ref 275–295)
POTASSIUM SERPL-SCNC: 3.8 MMOL/L (ref 3.5–5.1)
PROT SERPL-MCNC: 8 G/DL (ref 6.4–8.2)
SODIUM SERPL-SCNC: 142 MMOL/L (ref 136–145)
TRIGL SERPL-MCNC: 58 MG/DL (ref 30–149)
TSI SER-ACNC: 1.03 MIU/ML (ref 0.36–3.74)
VLDLC SERPL CALC-MCNC: 10 MG/DL (ref 0–30)

## 2023-10-17 NOTE — TELEPHONE ENCOUNTER
Received letter from Jd Ventura stating prior authorization needed for future Albuterol HFA refills. Please complete prior authorization. Has a history of asthma and may need future refills.

## 2023-10-17 NOTE — TELEPHONE ENCOUNTER
Approved    Prior authorization approved   Case ID: [id number]      Payer: [facility]    [phone]   Your request was approved based on the initial information provided at the time of the coverage request submission. Please allow additional time for the final decision to be made and added to the patient's account.    Electronic appeal: Not supported   View History

## 2023-10-18 LAB — HGBA1C: 5.8 %

## 2023-10-18 NOTE — TELEPHONE ENCOUNTER
Called patient to inform:   Albuterol approved 10/17/23 @ 1:43 pm and denied 10/17/23 7:00 pm  She said she had picked up yesterday at no cost.

## 2023-10-19 LAB
GENITAL VAGINOSIS SCREEN: NEGATIVE
TRICHOMONAS SCREEN: NEGATIVE

## 2023-12-20 DIAGNOSIS — J45.20 MILD INTERMITTENT ASTHMA, UNSPECIFIED WHETHER COMPLICATED: ICD-10-CM

## 2023-12-21 RX ORDER — MONTELUKAST SODIUM 10 MG/1
10 TABLET ORAL DAILY
Qty: 90 TABLET | Refills: 3 | Status: SHIPPED | OUTPATIENT
Start: 2023-12-21

## 2023-12-21 NOTE — TELEPHONE ENCOUNTER
Refill passed per [employer], [facility] protocol    Requested Prescriptions   Pending Prescriptions Disp Refills    MONTELUKAST 10 MG Oral Tab [Pharmacy Med Name: MONTELUKAST 10MG TABLETS] 90 tablet 0     Sig: TAKE 1 TABLET(10 MG) BY MOUTH DAILY       Asthma & COPD Medication Protocol Passed - 12/20/2023  6:10 AM        Passed - In person appointment or virtual visit in the past 6 mos or appointment in next 3 mos     Recent Outpatient Visits              2 months ago Routine physical examination    Rosette Mejias Ruston, APRN    Office Visit    2 months ago [address], LISS Lacey    Office Visit    10 months ago Encounter for Depo-Provera contraception    Cristina Mejias APRN    Office Visit    1 year ago Encounter for Depo-Provera contraception    [address]    Office Visit    1 year ago Goiter    [address], [place], [place] Lida Mcfadden MD    Office Visit          Future Appointments         Provider Department Appt Notes    In 2 weeks [facility]    In 1 month PFS DEXA RM 1; PFS XR RM1; PFS JAYA Tammie. Radha 10

## 2024-01-05 ENCOUNTER — LAB ENCOUNTER (OUTPATIENT)
Dept: LAB | Age: 49
End: 2024-01-05
Payer: MEDICAID

## 2024-01-05 ENCOUNTER — OFFICE VISIT (OUTPATIENT)
Dept: FAMILY MEDICINE CLINIC | Facility: CLINIC | Age: 49
End: 2024-01-05

## 2024-01-05 VITALS
RESPIRATION RATE: 19 BRPM | DIASTOLIC BLOOD PRESSURE: 81 MMHG | OXYGEN SATURATION: 97 % | TEMPERATURE: 98 F | BODY MASS INDEX: 38.41 KG/M2 | WEIGHT: 225 LBS | HEART RATE: 85 BPM | HEIGHT: 64 IN | SYSTOLIC BLOOD PRESSURE: 134 MMHG

## 2024-01-05 DIAGNOSIS — Z82.69 FAMILY HISTORY OF SYSTEMIC LUPUS ERYTHEMATOSUS: Primary | ICD-10-CM

## 2024-01-05 DIAGNOSIS — M25.50 ARTHRALGIA, UNSPECIFIED JOINT: ICD-10-CM

## 2024-01-05 DIAGNOSIS — M79.10 MYALGIA: ICD-10-CM

## 2024-01-05 DIAGNOSIS — J01.90 ACUTE NON-RECURRENT SINUSITIS, UNSPECIFIED LOCATION: ICD-10-CM

## 2024-01-05 DIAGNOSIS — R73.03 PREDIABETES: ICD-10-CM

## 2024-01-05 DIAGNOSIS — Z23 NEED FOR VACCINATION: ICD-10-CM

## 2024-01-05 DIAGNOSIS — Z82.69 FAMILY HISTORY OF SYSTEMIC LUPUS ERYTHEMATOSUS: ICD-10-CM

## 2024-01-05 DIAGNOSIS — G43.909 MIGRAINE WITHOUT STATUS MIGRAINOSUS, NOT INTRACTABLE, UNSPECIFIED MIGRAINE TYPE: ICD-10-CM

## 2024-01-05 LAB
CRP SERPL-MCNC: <0.4 MG/DL (ref ?–1)
EST. AVERAGE GLUCOSE BLD GHB EST-MCNC: 117 MG/DL (ref 68–126)
HBA1C MFR BLD: 5.7 % (ref ?–5.7)
RHEUMATOID FACT SERPL-ACNC: 81 IU/ML (ref ?–14)

## 2024-01-05 PROCEDURE — 3079F DIAST BP 80-89 MM HG: CPT

## 2024-01-05 PROCEDURE — 86140 C-REACTIVE PROTEIN: CPT

## 2024-01-05 PROCEDURE — 99213 OFFICE O/P EST LOW 20 MIN: CPT

## 2024-01-05 PROCEDURE — 86038 ANTINUCLEAR ANTIBODIES: CPT

## 2024-01-05 PROCEDURE — 90471 IMMUNIZATION ADMIN: CPT

## 2024-01-05 PROCEDURE — 83036 HEMOGLOBIN GLYCOSYLATED A1C: CPT

## 2024-01-05 PROCEDURE — 3075F SYST BP GE 130 - 139MM HG: CPT

## 2024-01-05 PROCEDURE — 36415 COLL VENOUS BLD VENIPUNCTURE: CPT

## 2024-01-05 PROCEDURE — 3008F BODY MASS INDEX DOCD: CPT

## 2024-01-05 PROCEDURE — 86225 DNA ANTIBODY NATIVE: CPT

## 2024-01-05 PROCEDURE — 90686 IIV4 VACC NO PRSV 0.5 ML IM: CPT

## 2024-01-05 PROCEDURE — 86431 RHEUMATOID FACTOR QUANT: CPT

## 2024-01-05 RX ORDER — IBUPROFEN 800 MG/1
800 TABLET ORAL EVERY 8 HOURS PRN
Qty: 45 TABLET | Refills: 0 | Status: SHIPPED | OUTPATIENT
Start: 2024-01-05

## 2024-01-05 RX ORDER — ONDANSETRON 4 MG/1
4 TABLET, ORALLY DISINTEGRATING ORAL EVERY 8 HOURS PRN
Qty: 15 TABLET | Refills: 0 | Status: SHIPPED | OUTPATIENT
Start: 2024-01-05

## 2024-01-05 RX ORDER — AMOXICILLIN AND CLAVULANATE POTASSIUM 875; 125 MG/1; MG/1
1 TABLET, FILM COATED ORAL 2 TIMES DAILY
Qty: 14 TABLET | Refills: 0 | Status: SHIPPED | OUTPATIENT
Start: 2024-01-05 | End: 2024-01-12

## 2024-01-05 NOTE — PROGRESS NOTES
HPI:    Patient ID: Arielle Kidd is a 48 year old female.    HPI     Patient here in office with complaint of arthralgia/myalgias, recently worsened since December 2023. Pain worse at night. Has been taking Ibuprofen, Naproxen, and Tylenol as needed. Reports family history of lupus, requesting labs .    Also complains of worsening migraines, nasal congestion, nausea, foul taste in mouth,  and tooth pain. Denies fever, ear pain, or sore throat. Scheduled to see dentist today for several cavities, will be getting partials. Unsure if related to symptoms.     States she was also  using Albuterol and Symbicort a lot last week due to shortness of breath and cough, has since improved.       Review of Systems   Constitutional: Negative.  Negative for fever.   HENT:  Positive for congestion. Negative for ear pain and sore throat.    Eyes: Negative.    Respiratory:  Negative for cough, shortness of breath and wheezing.    Cardiovascular: Negative.    Gastrointestinal: Negative.    Musculoskeletal: Negative.    Skin: Negative.    Neurological:  Positive for headaches.   Psychiatric/Behavioral: Negative.              Current Outpatient Medications   Medication Sig Dispense Refill    amoxicillin clavulanate 875-125 MG Oral Tab Take 1 tablet by mouth 2 (two) times daily for 7 days. 14 tablet 0    ondansetron 4 MG Oral Tablet Dispersible Take 1 tablet (4 mg total) by mouth every 8 (eight) hours as needed. 15 tablet 0    ibuprofen 800 MG Oral Tab Take 1 tablet (800 mg total) by mouth every 8 (eight) hours as needed for Pain. 45 tablet 0    montelukast 10 MG Oral Tab Take 1 tablet (10 mg total) by mouth daily. 90 tablet 3    PARoxetine 20 MG Oral Tab Take 1 tablet (20 mg total) by mouth every morning. 90 tablet 1    predniSONE 20 MG Oral Tab To take 2 tablets by oral route for 3 days then 1 tablet by oral route for 3 days then 1/2 tablet by oral route for 3 days 12 tablet 0    albuterol 108 (90 Base) MCG/ACT Inhalation Aero Soln  Inhale 2 puffs into the lungs every 4 (four) hours as needed. 18 g 2    SYMBICORT 160-4.5 MCG/ACT Inhalation Aerosol Inhale 2 puffs into the lungs 2 (two) times daily. 10.2 g 2    albuterol 108 (90 Base) MCG/ACT Inhalation Aero Soln Inhale 2 puffs into the lungs every 4 (four) hours as needed for Wheezing. 18 g 2    Spacer/Aero-Holding Chambers Does not apply Device Use with inhaler as needed 1 each 0    PREVIDENT 5000 PLUS 1.1 % Dental Cream        Allergies:No Known Allergies   /81   Pulse 85   Temp 97.8 °F (36.6 °C) (Temporal)   Resp 19   Ht 5' 4\" (1.626 m)   Wt 225 lb (102.1 kg)   LMP  (LMP Unknown)   SpO2 97%   BMI 38.62 kg/m²   Body mass index is 38.62 kg/m².  PHYSICAL EXAM:   Physical Exam  Vitals reviewed.   Constitutional:       General: She is not in acute distress.     Appearance: Normal appearance. She is not ill-appearing.   Cardiovascular:      Rate and Rhythm: Normal rate and regular rhythm.      Heart sounds: Normal heart sounds. No murmur heard.     No friction rub. No gallop.   Pulmonary:      Effort: Pulmonary effort is normal. No respiratory distress.      Breath sounds: No stridor. No wheezing, rhonchi or rales.   Chest:      Chest wall: No tenderness.   Skin:     General: Skin is warm.      Findings: No rash.   Neurological:      General: No focal deficit present.      Mental Status: She is alert and oriented to person, place, and time.   Psychiatric:         Mood and Affect: Mood normal.         Behavior: Behavior normal.         Thought Content: Thought content normal.         Judgment: Judgment normal.                ASSESSMENT/PLAN:   1. Family history of systemic lupus erythematosus  - Connective Tissue Disease (GRISELDA) Screen [E]; Future  - C-Reactive Protein [E]; Future    2. Acute non-recurrent sinusitis, unspecified location  - amoxicillin clavulanate 875-125 MG Oral Tab, Take 1 tablet by mouth 2 (two) times daily for 7 days.     3. Migraine without status migrainosus, not  intractable, unspecified migraine type  - ibuprofen 800 MG Oral Tab, Take 1 tablet (800 mg total) by mouth every 8 (eight) hours as needed for Pain.   - ondansetron 4 MG Oral Tablet Dispersible, Take 1 tablet (4 mg total) by mouth every 8 (eight) hours as needed.     4. Myalgia  - Connective Tissue Disease (GRISELDA) Screen [E]; Future  - C-Reactive Protein [E]; Future    5. Arthralgia, unspecified joint  - Connective Tissue Disease (GRISELDA) Screen [E]; Future  - Rheumatoid Arthritis Factor [E]; Future    6. Need for vaccination  - Influeza vaccination given       Orders Placed This Encounter   Procedures    Connective Tissue Disease (GRISELDA) Screen [E]    Rheumatoid Arthritis Factor [E]    C-Reactive Protein [E]    Fluzone Quadrivalent 6mo+ 0.5mL       Meds This Visit:  Requested Prescriptions     Signed Prescriptions Disp Refills    amoxicillin clavulanate 875-125 MG Oral Tab 14 tablet 0     Sig: Take 1 tablet by mouth 2 (two) times daily for 7 days.    ondansetron 4 MG Oral Tablet Dispersible 15 tablet 0     Sig: Take 1 tablet (4 mg total) by mouth every 8 (eight) hours as needed.    ibuprofen 800 MG Oral Tab 45 tablet 0     Sig: Take 1 tablet (800 mg total) by mouth every 8 (eight) hours as needed for Pain.       Imaging & Referrals:  INFLUENZA VAC, QUAD, PRSV FREE, 0.5 ML         ID#[id number]

## 2024-01-08 LAB
DSDNA IGG SERPL IA-ACNC: 2.7 IU/ML
ENA AB SER QL IA: 0.7 UG/L

## 2024-02-01 ENCOUNTER — HOSPITAL ENCOUNTER (OUTPATIENT)
Dept: GENERAL RADIOLOGY | Facility: HOSPITAL | Age: 49
Discharge: HOME | End: 2024-02-01
Attending: NURSE PRACTITIONER
Payer: MEDICAID

## 2024-02-01 ENCOUNTER — OFFICE VISIT (OUTPATIENT)
Dept: FAMILY MEDICINE CLINIC | Facility: CLINIC | Age: 49
End: 2024-02-01
Payer: MEDICAID

## 2024-02-01 VITALS
WEIGHT: 227 LBS | RESPIRATION RATE: 16 BRPM | SYSTOLIC BLOOD PRESSURE: 133 MMHG | BODY MASS INDEX: 38.76 KG/M2 | OXYGEN SATURATION: 97 % | HEIGHT: 64 IN | TEMPERATURE: 99 F | DIASTOLIC BLOOD PRESSURE: 81 MMHG | HEART RATE: 104 BPM

## 2024-02-01 DIAGNOSIS — Z87.01 HISTORY OF PNEUMONIA: ICD-10-CM

## 2024-02-01 DIAGNOSIS — R05.8 PRODUCTIVE COUGH: Primary | ICD-10-CM

## 2024-02-01 DIAGNOSIS — R03.0 ELEVATED BLOOD PRESSURE READING: ICD-10-CM

## 2024-02-01 DIAGNOSIS — R05.8 PRODUCTIVE COUGH: ICD-10-CM

## 2024-02-01 DIAGNOSIS — J06.9 URI WITH COUGH AND CONGESTION: ICD-10-CM

## 2024-02-01 PROCEDURE — 71046 X-RAY EXAM CHEST 2 VIEWS: CPT | Performed by: NURSE PRACTITIONER

## 2024-02-01 PROCEDURE — 3075F SYST BP GE 130 - 139MM HG: CPT | Performed by: NURSE PRACTITIONER

## 2024-02-01 PROCEDURE — 99203 OFFICE O/P NEW LOW 30 MIN: CPT | Performed by: NURSE PRACTITIONER

## 2024-02-01 PROCEDURE — 3079F DIAST BP 80-89 MM HG: CPT | Performed by: NURSE PRACTITIONER

## 2024-02-01 PROCEDURE — 3008F BODY MASS INDEX DOCD: CPT | Performed by: NURSE PRACTITIONER

## 2024-02-01 RX ORDER — FLUTICASONE PROPIONATE 50 MCG
2 SPRAY, SUSPENSION (ML) NASAL DAILY
Qty: 1 EACH | Refills: 0 | Status: SHIPPED | OUTPATIENT
Start: 2024-02-01

## 2024-02-01 RX ORDER — MEDROXYPROGESTERONE ACETATE 150 MG/ML
INJECTION, SUSPENSION INTRAMUSCULAR
COMMUNITY

## 2024-02-01 RX ORDER — CETIRIZINE HYDROCHLORIDE 10 MG/1
10 TABLET ORAL DAILY
Qty: 30 TABLET | Refills: 0 | Status: SHIPPED | OUTPATIENT
Start: 2024-02-01

## 2024-02-01 RX ORDER — GUAIFENESIN 600 MG/1
1200 TABLET, EXTENDED RELEASE ORAL 2 TIMES DAILY PRN
Qty: 30 TABLET | Refills: 0 | Status: SHIPPED | OUTPATIENT
Start: 2024-02-01

## 2024-02-01 RX ORDER — DEXTROMETHORPHAN POLISTIREX 30 MG/5ML
10 SUSPENSION ORAL 2 TIMES DAILY PRN
Qty: 148 ML | Refills: 0 | Status: SHIPPED | OUTPATIENT
Start: 2024-02-01

## 2024-02-01 NOTE — PROGRESS NOTES
CHIEF COMPLAINT:     Chief Complaint   Patient presents with    Cough     Sx 2 weeks - Productive cough, nasal congestion  Denies ST, fever, chills, sinus pressure, body aches  Negative Covid test 2 days ago  No OTC       HPI:   Arielle Kidd is a 48 year old female who presents for upper respiratory symptoms for  2 weeks. Patient reports congestion, cough with yellow colored sputum, cough is keeping pt up at night, prior history of bronchitis, prior history of pneumonia, denies fever. Pt has asthma and has been using her maintenance inhaler regularly, she has been using her albuterol inhaler two times a day. Symptoms have been stable since onset.  Treating symptoms with: none.  Denies shortness of breath, difficulty breathing and chest pain. Received COVID and FLU vaccines, declines viral testing today.     Current Outpatient Medications   Medication Sig Dispense Refill    medroxyPROGESTERone 150 MG/ML Intramuscular Suspension Inject into the muscle.      fluticasone propionate 50 MCG/ACT Nasal Suspension 2 sprays by Each Nare route daily. 1 each 0    cetirizine 10 MG Oral Tab Take 1 tablet (10 mg total) by mouth daily. 30 tablet 0    guaiFENesin ER (MUCINEX) 600 MG Oral Tablet 12 Hr Take 2 tablets (1,200 mg total) by mouth 2 (two) times daily as needed for congestion (cough). 30 tablet 0    dextromethorphan polistirex ER 30 MG/5ML Oral Suspension Extended Release Take 10 mL (60 mg total) by mouth 2 (two) times daily as needed for cough. 148 mL 0    ibuprofen 800 MG Oral Tab Take 1 tablet (800 mg total) by mouth every 8 (eight) hours as needed for Pain. 45 tablet 0    montelukast 10 MG Oral Tab Take 1 tablet (10 mg total) by mouth daily. 90 tablet 3    PARoxetine 20 MG Oral Tab Take 1 tablet (20 mg total) by mouth every morning. 90 tablet 1    SYMBICORT 160-4.5 MCG/ACT Inhalation Aerosol Inhale 2 puffs into the lungs 2 (two) times daily. 10.2 g 2    albuterol 108 (90 Base) MCG/ACT Inhalation Aero Soln Inhale 2  puffs into the lungs every 4 (four) hours as needed for Wheezing. 18 g 2    Spacer/Aero-Holding Chambers Does not apply Device Use with inhaler as needed 1 each 0    PREVIDENT 5000 PLUS 1.1 % Dental Cream         Past Medical History:   Diagnosis Date    Anxiety     Asthma       History reviewed. No pertinent surgical history.      Social History     Socioeconomic History    Marital status: OTHER   Tobacco Use    Smoking status: Never    Smokeless tobacco: Never   Vaping Use    Vaping Use: Former   Substance and Sexual Activity    Alcohol use: Yes     Comment: occasionally     Drug use: Never         REVIEW OF SYSTEMS:   GENERAL: normal appetite  SKIN: no rashes or abnormal skin lesions  HEENT: See HPI  LUNGS: See HPI  CARDIOVASCULAR: denies chest pain or palpitations   GI: denies N/V/C or abdominal pain      EXAM:   /81   Pulse 104   Temp 98.5 °F (36.9 °C)   Resp 16   Ht 5' 4\" (1.626 m)   Wt 227 lb (103 kg)   LMP  (LMP Unknown)   SpO2 97%   BMI 38.96 kg/m²   GENERAL: well developed, well nourished,in no apparent distress  SKIN: no rashes,no suspicious lesions  HEAD: atraumatic, normocephalic.  No tenderness on palpation of sinuses  EYES: conjunctiva clear, EOM intact  EARS: TM's WNL, no bulging, no retraction,no fluid, bony landmarks present  NOSE: Nostrils patent, scant nasal discharge, nasal mucosa red and inflammed   THROAT: Oral mucosa pink, moist. Posterior pharynx is non erythematous. no exudates. Tonsils 1/4.    NECK: Supple, non-tender  LUNGS: clear to auscultation bilaterally, no wheezes or rhonchi. Breathing is non labored.  CARDIO: RRR without murmur  EXTREMITIES: no cyanosis, clubbing or edema  LYMPH:  No lymphadenopathy.        ASSESSMENT AND PLAN:   Arielle Kidd is a 48 year old female who presents with upper respiratory symptoms that are consistent with    ASSESSMENT:   Encounter Diagnoses   Name Primary?    Productive cough Yes    History of pneumonia     Elevated blood pressure  reading     URI with cough and congestion        PLAN: Pt has history of pneumonia with productive cough currently for the last 2 weeks, Chest x-ray ordered and within normal limits. No wheezing appreciated on exam. Encouraged pt to use her albuterol inhaler every 4-5 hours as needed for cough. For congestion , post nasal drip, and productive cough, use daily Flonase and Zyrtec. May use Delsym or Mucinex for cough Supressant or expectorant. Meds as below.  Comfort care as described in Patient Instructions. If shortness of breath, difficulty breathing, or chest pain occur, report to the nearest emergency room for prompt evaluation.     Meds & Refills for this Visit:  Requested Prescriptions     Signed Prescriptions Disp Refills    fluticasone propionate 50 MCG/ACT Nasal Suspension 1 each 0     Si sprays by Each Nare route daily.    cetirizine 10 MG Oral Tab 30 tablet 0     Sig: Take 1 tablet (10 mg total) by mouth daily.    guaiFENesin ER (MUCINEX) 600 MG Oral Tablet 12 Hr 30 tablet 0     Sig: Take 2 tablets (1,200 mg total) by mouth 2 (two) times daily as needed for congestion (cough).    dextromethorphan polistirex ER 30 MG/5ML Oral Suspension Extended Release 148 mL 0     Sig: Take 10 mL (60 mg total) by mouth 2 (two) times daily as needed for cough.     Risks, benefits, and side effects of medication explained and discussed.    The patient indicates understanding of these issues and agrees to the plan.  The patient is asked to f/u with PCP if sx's persist or worsen.

## 2024-03-15 ENCOUNTER — LAB ENCOUNTER (OUTPATIENT)
Dept: LAB | Age: 49
End: 2024-03-15
Payer: MEDICAID

## 2024-03-15 ENCOUNTER — OFFICE VISIT (OUTPATIENT)
Dept: RHEUMATOLOGY | Facility: CLINIC | Age: 49
End: 2024-03-15
Payer: MEDICAID

## 2024-03-15 ENCOUNTER — HOSPITAL ENCOUNTER (OUTPATIENT)
Dept: GENERAL RADIOLOGY | Age: 49
Discharge: HOME | End: 2024-03-15
Attending: INTERNAL MEDICINE
Payer: MEDICAID

## 2024-03-15 VITALS
OXYGEN SATURATION: 96 % | SYSTOLIC BLOOD PRESSURE: 112 MMHG | HEIGHT: 64 IN | HEART RATE: 92 BPM | BODY MASS INDEX: 38.07 KG/M2 | DIASTOLIC BLOOD PRESSURE: 70 MMHG | WEIGHT: 223 LBS

## 2024-03-15 DIAGNOSIS — Z83.2 FAMILY HISTORY OF AUTOIMMUNE DISORDER: ICD-10-CM

## 2024-03-15 DIAGNOSIS — M06.4 INFLAMMATORY POLYARTHRITIS (HCC): Primary | ICD-10-CM

## 2024-03-15 DIAGNOSIS — M06.4 INFLAMMATORY POLYARTHRITIS (HCC): ICD-10-CM

## 2024-03-15 DIAGNOSIS — R79.9 ABNORMAL BLOOD FINDING: ICD-10-CM

## 2024-03-15 DIAGNOSIS — R79.9 ABNORMAL BLOOD CHEMISTRY: Primary | ICD-10-CM

## 2024-03-15 LAB
ALT SERPL-CCNC: 13 U/L
AST SERPL-CCNC: 15 U/L (ref ?–34)
BASOPHILS # BLD AUTO: 0.08 X10(3) UL (ref 0–0.2)
BASOPHILS NFR BLD AUTO: 1.1 %
BILIRUB UR QL: NEGATIVE
C3 SERPL-MCNC: 158.6 MG/DL (ref 90–170)
C4 SERPL-MCNC: 26.3 MG/DL (ref 12–36)
CLARITY UR: CLEAR
CREAT BLD-MCNC: 0.95 MG/DL
CRP SERPL-MCNC: <0.4 MG/DL (ref ?–1)
DEPRECATED RDW RBC AUTO: 40 FL (ref 35.1–46.3)
EGFRCR SERPLBLD CKD-EPI 2021: 74 ML/MIN/1.73M2 (ref 60–?)
EOSINOPHIL # BLD AUTO: 0.16 X10(3) UL (ref 0–0.7)
EOSINOPHIL NFR BLD AUTO: 2.1 %
ERYTHROCYTE [DISTWIDTH] IN BLOOD BY AUTOMATED COUNT: 12.1 % (ref 11–15)
ERYTHROCYTE [SEDIMENTATION RATE] IN BLOOD: 24 MM/HR
GLUCOSE UR-MCNC: NORMAL MG/DL
HAV IGM SER QL: NONREACTIVE
HBV CORE IGM SER QL: NONREACTIVE
HBV SURFACE AG SERPL QL IA: NONREACTIVE
HCT VFR BLD AUTO: 39.1 %
HCV AB SERPL QL IA: NONREACTIVE
HGB BLD-MCNC: 13.2 G/DL
HGB UR QL STRIP.AUTO: NEGATIVE
IMM GRANULOCYTES # BLD AUTO: 0.01 X10(3) UL (ref 0–1)
IMM GRANULOCYTES NFR BLD: 0.1 %
KETONES UR-MCNC: NEGATIVE MG/DL
LEUKOCYTE ESTERASE UR QL STRIP.AUTO: 500
LYMPHOCYTES # BLD AUTO: 1.97 X10(3) UL (ref 1–4)
LYMPHOCYTES NFR BLD AUTO: 26.4 %
MCH RBC QN AUTO: 30.2 PG (ref 26–34)
MCHC RBC AUTO-ENTMCNC: 33.8 G/DL (ref 31–37)
MCV RBC AUTO: 89.5 FL
MONOCYTES # BLD AUTO: 0.65 X10(3) UL (ref 0.1–1)
MONOCYTES NFR BLD AUTO: 8.7 %
NEUTROPHILS # BLD AUTO: 4.58 X10 (3) UL (ref 1.5–7.7)
NEUTROPHILS # BLD AUTO: 4.58 X10(3) UL (ref 1.5–7.7)
NEUTROPHILS NFR BLD AUTO: 61.6 %
NITRITE UR QL STRIP.AUTO: NEGATIVE
PH UR: 5.5 [PH] (ref 5–8)
PLATELET # BLD AUTO: 204 10(3)UL (ref 150–450)
PROT UR-MCNC: NEGATIVE MG/DL
RBC # BLD AUTO: 4.37 X10(6)UL
SP GR UR STRIP: 1.02 (ref 1–1.03)
UROBILINOGEN UR STRIP-ACNC: NORMAL
WBC # BLD AUTO: 7.5 X10(3) UL (ref 4–11)
YEAST UR QL: PRESENT /HPF

## 2024-03-15 PROCEDURE — 86039 ANTINUCLEAR ANTIBODIES (ANA): CPT | Performed by: INTERNAL MEDICINE

## 2024-03-15 PROCEDURE — 73562 X-RAY EXAM OF KNEE 3: CPT | Performed by: INTERNAL MEDICINE

## 2024-03-15 PROCEDURE — 86200 CCP ANTIBODY: CPT

## 2024-03-15 PROCEDURE — 86140 C-REACTIVE PROTEIN: CPT

## 2024-03-15 PROCEDURE — 82565 ASSAY OF CREATININE: CPT

## 2024-03-15 PROCEDURE — 84165 PROTEIN E-PHORESIS SERUM: CPT | Performed by: INTERNAL MEDICINE

## 2024-03-15 PROCEDURE — 86235 NUCLEAR ANTIGEN ANTIBODY: CPT | Performed by: INTERNAL MEDICINE

## 2024-03-15 PROCEDURE — 86225 DNA ANTIBODY NATIVE: CPT | Performed by: INTERNAL MEDICINE

## 2024-03-15 PROCEDURE — 86334 IMMUNOFIX E-PHORESIS SERUM: CPT | Performed by: INTERNAL MEDICINE

## 2024-03-15 PROCEDURE — 86160 COMPLEMENT ANTIGEN: CPT

## 2024-03-15 PROCEDURE — 84460 ALANINE AMINO (ALT) (SGPT): CPT

## 2024-03-15 PROCEDURE — 83521 IG LIGHT CHAINS FREE EACH: CPT | Performed by: INTERNAL MEDICINE

## 2024-03-15 PROCEDURE — 84450 TRANSFERASE (AST) (SGOT): CPT

## 2024-03-15 PROCEDURE — 86225 DNA ANTIBODY NATIVE: CPT

## 2024-03-15 PROCEDURE — 85652 RBC SED RATE AUTOMATED: CPT

## 2024-03-15 PROCEDURE — 81001 URINALYSIS AUTO W/SCOPE: CPT | Performed by: INTERNAL MEDICINE

## 2024-03-15 PROCEDURE — 80074 ACUTE HEPATITIS PANEL: CPT

## 2024-03-15 PROCEDURE — 99205 OFFICE O/P NEW HI 60 MIN: CPT | Performed by: INTERNAL MEDICINE

## 2024-03-15 PROCEDURE — 86038 ANTINUCLEAR ANTIBODIES: CPT | Performed by: INTERNAL MEDICINE

## 2024-03-15 PROCEDURE — 85025 COMPLETE CBC W/AUTO DIFF WBC: CPT

## 2024-03-15 PROCEDURE — 36415 COLL VENOUS BLD VENIPUNCTURE: CPT

## 2024-03-15 PROCEDURE — 86480 TB TEST CELL IMMUN MEASURE: CPT

## 2024-03-15 RX ORDER — PREDNISONE 5 MG/1
TABLET ORAL
Qty: 75 TABLET | Refills: 0 | Status: SHIPPED | OUTPATIENT
Start: 2024-03-15 | End: 2024-04-28

## 2024-03-15 NOTE — PROGRESS NOTES
RHEUMATOLOGY CLINIC- NEW PATIENT    Arielle Kidd is a 48 year old female.    ASSESSMENT/PLAN:       ICD-10-CM    1. Inflammatory polyarthritis (HCC)  M06.4 Cyclic Citrullinate Pep. IGG     Hepatitis Panel, Acute (4)     Quantiferon TB Plus     Miscellaneous Testing     Anti-Nuclear Antibody (GRISELDA) by IFA, Reflex Titer + Specific Antibodies     Complement C3, Serum     Complement C4, Serum     CBC With Differential With Platelet     C-Reactive Protein     Sed Rate, Westergren (Automated)     ALT(SGPT)     AST (SGOT)     Creatinine, Serum     Monoclonal Protein Study     Urinalysis, Routine     predniSONE 5 MG Oral Tab     XR KNEE (3 VIEWS), RIGHT (CPT=73562)     XR KNEE (3 VIEWS), LEFT (CPT=73562)      2. Gamma Gap  R79.9 Cyclic Citrullinate Pep. IGG     Hepatitis Panel, Acute (4)     Quantiferon TB Plus     Miscellaneous Testing     Anti-Nuclear Antibody (GRISELDA) by IFA, Reflex Titer + Specific Antibodies     Complement C3, Serum     Complement C4, Serum     CBC With Differential With Platelet     C-Reactive Protein     Sed Rate, Westergren (Automated)     ALT(SGPT)     AST (SGOT)     Creatinine, Serum     Monoclonal Protein Study     Urinalysis, Routine     predniSONE 5 MG Oral Tab     XR KNEE (3 VIEWS), RIGHT (CPT=73562)     XR KNEE (3 VIEWS), LEFT (CPT=73562)      3. Family history of autoimmune disorder  Z83.2 Cyclic Citrullinate Pep. IGG     Hepatitis Panel, Acute (4)     Quantiferon TB Plus     Miscellaneous Testing     Anti-Nuclear Antibody (GRISELDA) by IFA, Reflex Titer + Specific Antibodies     Complement C3, Serum     Complement C4, Serum     CBC With Differential With Platelet     C-Reactive Protein     Sed Rate, Westergren (Automated)     ALT(SGPT)     AST (SGOT)     Creatinine, Serum     Monoclonal Protein Study     Urinalysis, Routine     predniSONE 5 MG Oral Tab     XR KNEE (3 VIEWS), RIGHT (CPT=73562)     XR KNEE (3 VIEWS), LEFT (CPT=73562)            DISCUSSION:  Patient presents as a new outpatient  referral from her PCP for evaluation of fatigue and polyarthralgias in the setting of positive RF and positive GRISELDA by ALONSO.  She also has a strong family history of autoimmune disorders including lupus and gout.  Interestingly, patient treated with prednisone in the past for asthma exacerbations which improved her symptoms.  She does have synovitis on current exam as well as a rash along her chest that she notes that started in the summer that comes and goes.  At this time, will workup further for possible underlying connective tissue disease such as lupus versus an inflammatory arthritis like RA.  In the meantime, will prescribe a prednisone taper with patient to remain on 5 mg until follow-up.    PLAN:  -Start p.o. prednisone taper starting at 20 mg every morning with taper until 5 mg daily until follow-up with me.  - Patient to obtain the above nonfasting lab work  - Will check bilateral knee x-rays  - Consult/evaluation communicated with referring physician/provider.    Pt will f/u in about 5 weeks with workup completed prior.    Wilder Ward DO  3/15/2024   8:45 AM    Patient has 1 acute on chronic illness that poses a threat to life or bodily function.  I performed an independent interpretation of tests completed by another healthcare professional and am discussing management and test interpretation with and external healthcare professional.      HPI:     Chief Complaint   Patient presents with    Joint Pain    Lab Results       I had the pleasure of seeing Arielle Kidd on 3/15/2024 as a new outpatient consultation for positive RF serologies. The patient was originally referred by LISS Ramsay.     48 year old female w/ PMH asthma, goiter who presents to clinic today. Patient originally evaluated at her primary care office for arthralgias/myalgias worsening since December 2023.  She describes back, knee, hand pain with associated swelling described as an aching pain that is worse even while  resting.  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  She has AM stiffness for about 1 hour.  Also reports generalized fatigue.  Symptoms are worse at night and patient attempting to treat with as needed ibuprofen, naproxen, Tylenol.  ROS negative for unexplained fever, chills, unintentional weight loss, Raynaud's phenomenon, sicca symptoms (however, patient notes recent complicated dental history given weak and broken teeth), hematuria.  She notes an itchy rash alongside her chest that she first noted during the summertime that comes and goes.  She has a strong family history of autoimmune diseases including a mother with SLE/RA, maternal uncle with SLE, maternal aunts with SLE, and a maternal grandmother with RA.  Interestingly, patient was treated for an asthma exacerbation October 2023 with prednisone 80 mg daily with significant improvement of her symptoms.    Current Medications:  PRN Ibuprofen, Naproxen, Tylenol- Ineffective    Medication History:  Prednisone-responsive, patient previously treated for asthma flares with improvement of her arthralgias    Interval History:   See Above    HISTORY:  Past Medical History:   Diagnosis Date    Anxiety     Asthma ([facility])       Social Hx Reviewed   Family Hx Reviewed     Medications (Active prior to today's visit):  Current Outpatient Medications   Medication Sig Dispense Refill    fluticasone propionate 50 MCG/ACT Nasal Suspension 2 sprays by Each Nare route daily. 1 each 0    cetirizine 10 MG Oral Tab Take 1 tablet (10 mg total) by mouth daily. 30 tablet 0    guaiFENesin ER (MUCINEX) 600 MG Oral Tablet 12 Hr Take 2 tablets (1,200 mg total) by mouth 2 (two) times daily as needed for congestion (cough). 30 tablet 0    ibuprofen 800 MG Oral Tab Take 1 tablet (800 mg total) by mouth every 8 (eight) hours as needed for Pain. 45 tablet 0    montelukast 10 MG Oral Tab Take 1 tablet  (10 mg total) by mouth daily. 90 tablet 3    PARoxetine 20 MG Oral Tab Take 1 tablet (20 mg total) by mouth every morning. 90 tablet 1    SYMBICORT 160-4.5 MCG/ACT Inhalation Aerosol Inhale 2 puffs into the lungs 2 (two) times daily. 10.2 g 2    albuterol 108 (90 Base) MCG/ACT Inhalation Aero Soln Inhale 2 puffs into the lungs every 4 (four) hours as needed for Wheezing. 18 g 2    medroxyPROGESTERone 150 MG/ML Intramuscular Suspension Inject into the muscle.      dextromethorphan polistirex ER 30 MG/5ML Oral Suspension Extended Release Take 10 mL (60 mg total) by mouth 2 (two) times daily as needed for cough. (Patient not taking: Reported on 3/15/2024) 148 mL 0    Spacer/Aero-Holding Chambers Does not apply Device Use with inhaler as needed 1 each 0    PREVIDENT 5000 PLUS 1.1 % Dental Cream          Allergies:  No Known Allergies      ROS:   Review of Systems   Constitutional:  Positive for fatigue. Negative for chills and fever.   HENT:  Negative for congestion, hearing loss, mouth sores, nosebleeds and trouble swallowing.    Eyes:  Negative for photophobia, pain, redness and visual disturbance.   Respiratory:  Negative for cough and shortness of breath.    Cardiovascular:  Negative for chest pain, palpitations and leg swelling.   Gastrointestinal:  Negative for abdominal pain, blood in stool, diarrhea and nausea.   Endocrine: Negative for cold intolerance and heat intolerance.   Genitourinary:  Negative for dysuria, frequency and hematuria.   Musculoskeletal:  Positive for arthralgias, back pain and joint swelling. Negative for gait problem, myalgias, neck pain and neck stiffness.   Skin:  Positive for rash. Negative for color change.   Neurological:  Negative for dizziness, weakness, numbness and headaches.   Psychiatric/Behavioral:  Negative for confusion and dysphoric mood.         PHYSICAL EXAM:     Constitutional:  Well developed, Well nourished, No acute distress  HENT:  Normocephalic, Atraumatic, Bilateral  external ears normal, Oropharynx moist, No oral exudates.  Neck: Normal range of motion, No tenderness, Supple, No stridor.  Eyes:  PERRL, EOMI, Conjunctiva normal, No discharge.  Respiratory:  Normal breath sounds, No respiratory distress, No wheezing.  Cardiovascular:  Normal heart rate, Normal rhythm, No murmurs, No rubs, No gallops.  GI:  Bowel sounds normal, Soft, No tenderness, No masses, No pulsatile masses.  : No CVA tenderness.  Musculoskeletal:  A comprehensive 28 count joint exam was done and was negative for swelling or tenderness except as noted. Inspections for misalignment, asymmetry, crepitation, defects, tenderness, masses, nodules, effusions, range of motion, and stability in the upper and lower extremities bilaterally are all normal unless noted.           Integument:  Warm, Dry, No erythema, hyperpigmented rash along R chest  Lymphatic:  No lymphadenopathy noted.  Neurologic:  Alert & oriented x 3, Normal motor function, Normal sensory function, No focal deficits noted.  Psychiatric:  Affect normal, Judgment normal, Mood normal.    LABS:     Prior lab work reviewed and notable for:    1/5/2024:  CTD screen by ALONSO equivocal    10/16/2023:  TSH 1.030  CMP with normal renal and hepatic function, gamma gap noted  CBC without cytopenias nor leukocytosis  RF 81 (high)  CRP less than 0.4  Imaging:       N/A

## 2024-03-18 LAB
ALBUMIN SERPL ELPH-MCNC: 4.24 G/DL (ref 3.75–5.21)
ALBUMIN/GLOB SERPL: 1.22 {RATIO} (ref 1–2)
ALPHA1 GLOB SERPL ELPH-MCNC: 0.31 G/DL (ref 0.19–0.46)
ALPHA2 GLOB SERPL ELPH-MCNC: 0.63 G/DL (ref 0.48–1.05)
B-GLOBULIN SERPL ELPH-MCNC: 0.99 G/DL (ref 0.68–1.23)
CCP IGG SERPL-ACNC: >340 U/ML (ref 0–6.9)
DSDNA IGG SERPL IA-ACNC: 2.3 IU/ML
GAMMA GLOB SERPL ELPH-MCNC: 1.53 G/DL (ref 0.62–1.7)
KAPPA LC FREE SER-MCNC: 2.84 MG/DL (ref 0.33–1.94)
KAPPA LC FREE/LAMBDA FREE SER NEPH: 1.34 {RATIO} (ref 0.26–1.65)
LAMBDA LC FREE SERPL-MCNC: 2.11 MG/DL (ref 0.57–2.63)
M TB IFN-G CD4+ T-CELLS BLD-ACNC: 0.03 IU/ML
M TB TUBERC IFN-G BLD QL: NEGATIVE
M TB TUBERC IGNF/MITOGEN IGNF CONTROL: >10 IU/ML
NUCLEAR IGG TITR SER IF: POSITIVE {TITER}
PROT SERPL-MCNC: 7.7 G/DL (ref 5.7–8.2)
QFT TB1 AG MINUS NIL: 0 IU/ML
QFT TB2 AG MINUS NIL: 0 IU/ML

## 2024-03-19 LAB
ANA NUCLEOLAR TITR SER IF: 320 {TITER}
CENTROMERE IGG SER-ACNC: <0.4 U/ML
DSDNA AB TITR SER: <10 {TITER}
ENA JO1 AB SER IA-ACNC: 0.3 U/ML
ENA RNP IGG SER IA-ACNC: 1 U/ML
ENA SCL70 IGG SER IA-ACNC: 0.6 U/ML
ENA SM IGG SER IA-ACNC: <0.7 U/ML
ENA SS-A IGG SER IA-ACNC: 3.5 U/ML
ENA SS-B IGG SER IA-ACNC: <0.4 U/ML
U1 SNRNP IGG SER IA-ACNC: 1.5 U/ML

## 2024-04-06 ENCOUNTER — TELEPHONE (OUTPATIENT)
Dept: FAMILY MEDICINE CLINIC | Facility: CLINIC | Age: 49
End: 2024-04-06

## 2024-04-06 ENCOUNTER — OFFICE VISIT (OUTPATIENT)
Dept: FAMILY MEDICINE CLINIC | Facility: CLINIC | Age: 49
End: 2024-04-06

## 2024-04-06 VITALS
HEIGHT: 64 IN | BODY MASS INDEX: 39.09 KG/M2 | DIASTOLIC BLOOD PRESSURE: 72 MMHG | RESPIRATION RATE: 16 BRPM | TEMPERATURE: 98 F | SYSTOLIC BLOOD PRESSURE: 123 MMHG | HEART RATE: 99 BPM | OXYGEN SATURATION: 97 % | WEIGHT: 229 LBS

## 2024-04-06 DIAGNOSIS — Z30.09 ENCOUNTER FOR COUNSELING REGARDING CONTRACEPTION: ICD-10-CM

## 2024-04-06 DIAGNOSIS — M05.79 RHEUMATOID ARTHRITIS INVOLVING MULTIPLE SITES WITH POSITIVE RHEUMATOID FACTOR (HCC): Primary | ICD-10-CM

## 2024-04-06 RX ORDER — MELOXICAM 7.5 MG/1
7.5 TABLET ORAL DAILY
Qty: 30 TABLET | Refills: 0 | Status: SHIPPED | OUTPATIENT
Start: 2024-04-06

## 2024-04-06 RX ORDER — MEDROXYPROGESTERONE ACETATE 150 MG/ML
150 INJECTION, SUSPENSION INTRAMUSCULAR ONCE
Status: COMPLETED | OUTPATIENT
Start: 2024-04-06 | End: 2024-04-06

## 2024-04-06 RX ADMIN — MEDROXYPROGESTERONE ACETATE 150 MG: 150 INJECTION, SUSPENSION INTRAMUSCULAR at 08:35:00

## 2024-04-06 NOTE — TELEPHONE ENCOUNTER
Patient seen in office today and is requesting a call regarding lab work completed on 3/19/24.   Taking prednisone 5 mg daily and would like further medication instructions

## 2024-04-06 NOTE — PROGRESS NOTES
HPI:    Patient ID: Arielle Kidd is a 48 year old female.    HPI     Patient here in the office for follow-up regarding positive GRISELDA and rheumatoid factor. Was seen by rheumatology on 3/15/24 and additional blood test completed and patient started on prednisone tapering dose (currently taking 5 mg daily). States she has not received call back regarding results. Per patient, On prednisone taper, take 5 mg daily (have enough to cover until weekend). Reports prednisone helping with arthralgia. Takes Ibuprofen intermittently, mostly at night which does not help. Interested in alternative medication.     Due for Depo Provera injection today.     Review of Systems   Constitutional: Negative.    Respiratory: Negative.     Cardiovascular: Negative.    Musculoskeletal:  Positive for arthralgias.   Skin: Negative.    Neurological: Negative.    Psychiatric/Behavioral: Negative.              Current Outpatient Medications   Medication Sig Dispense Refill    Meloxicam 7.5 MG Oral Tab Take 1 tablet (7.5 mg total) by mouth daily. 30 tablet 0    predniSONE 5 MG Oral Tab Take 4 tablets (20 mg total) by mouth daily for 5 days, THEN 3 tablets (15 mg total) daily for 5 days, THEN 2 tablets (10 mg total) daily for 5 days, THEN 1 tablet (5 mg total) daily. Take in the morning after breakfast.  If symptoms flareup, contact rheumatology office for further dosing instructions.. 75 tablet 0    fluticasone propionate 50 MCG/ACT Nasal Suspension 2 sprays by Each Nare route daily. 1 each 0    cetirizine 10 MG Oral Tab Take 1 tablet (10 mg total) by mouth daily. 30 tablet 0    montelukast 10 MG Oral Tab Take 1 tablet (10 mg total) by mouth daily. 90 tablet 3    SYMBICORT 160-4.5 MCG/ACT Inhalation Aerosol Inhale 2 puffs into the lungs 2 (two) times daily. 10.2 g 2    albuterol 108 (90 Base) MCG/ACT Inhalation Aero Soln Inhale 2 puffs into the lungs every 4 (four) hours as needed for Wheezing. 18 g 2    Spacer/Aero-Holding Chambers Does not  apply Device Use with inhaler as needed 1 each 0    PREVIDENT 5000 PLUS 1.1 % Dental Cream        Allergies:No Known Allergies   /72   Pulse 99   Temp 98.2 °F (36.8 °C) (Temporal)   Resp 16   Ht 5' 4\" (1.626 m)   Wt 229 lb (103.9 kg)   LMP  (LMP Unknown)   SpO2 97%   BMI 39.31 kg/m²   Body mass index is 39.31 kg/m².  PHYSICAL EXAM:   Physical Exam  Vitals reviewed.   Constitutional:       General: She is not in acute distress.     Appearance: Normal appearance. She is not ill-appearing.   Cardiovascular:      Rate and Rhythm: Normal rate and regular rhythm.      Heart sounds: Normal heart sounds. No murmur heard.     No friction rub. No gallop.   Pulmonary:      Effort: Pulmonary effort is normal. No respiratory distress.      Breath sounds: Normal breath sounds. No stridor. No wheezing, rhonchi or rales.   Chest:      Chest wall: No tenderness.   Musculoskeletal:         General: Tenderness present. No swelling or signs of injury.      Comments: Bilateral knee pain    Skin:     General: Skin is warm.      Findings: No rash.   Neurological:      General: No focal deficit present.      Mental Status: She is alert and oriented to person, place, and time.   Psychiatric:         Mood and Affect: Mood normal.         Behavior: Behavior normal.         Thought Content: Thought content normal.         Judgment: Judgment normal.                ASSESSMENT/PLAN:   1. Rheumatoid arthritis involving multiple sites with positive rheumatoid factor (HCC)  - APRN will send message to rheumatology to reach out to patient regarding results  - Continue prednisone as prescribed by rheumatology  - Discontinue Ibuprofen. Start Meloxicam 7.5 mg daily     2. Encounter for counseling regarding contraception  - medroxyPROGESTERone (Depo-Provera) 150 MG/ML injection 150 mg      Orders Placed This Encounter   Procedures    POC Urine pregnancy test [10489]       Meds This Visit:  Requested Prescriptions     Signed Prescriptions  Disp Refills    Meloxicam 7.5 MG Oral Tab 30 tablet 0     Sig: Take 1 tablet (7.5 mg total) by mouth daily.       Imaging & Referrals:  None         ID#[id number]

## 2024-04-08 NOTE — TELEPHONE ENCOUNTER
Hello-could someone please reach out to this patient to see if she is available for either an in person or virtual follow-up to fully discuss her recent lab work and possible next steps?  Looks like I have an open slot Wednesday morning as well as Friday morning.  Thank you.

## 2024-04-09 NOTE — TELEPHONE ENCOUNTER
Talked to patient appointment made and told patient to call her insurance to re-verify if her insurance will cover Video and patient understood.

## 2024-04-09 NOTE — TELEPHONE ENCOUNTER
Noted.      Future Appointments   Date Time Provider Department [place]   4/10/2024 12:00 PM [facility]   5/11/2024  8:30 AM [facility]   6/22/2024  8:30 AM EC CLEMENTINA FERNÁNDEZ RN [facility]

## 2024-04-10 ENCOUNTER — TELEMEDICINE (OUTPATIENT)
Dept: RHEUMATOLOGY | Facility: CLINIC | Age: 49
End: 2024-04-10
Payer: MEDICAID

## 2024-04-10 DIAGNOSIS — R76.8 POSITIVE ANA (ANTINUCLEAR ANTIBODY): ICD-10-CM

## 2024-04-10 DIAGNOSIS — M05.9 SEROPOSITIVE RHEUMATOID ARTHRITIS (HCC): Primary | ICD-10-CM

## 2024-04-10 DIAGNOSIS — R79.9 ABNORMAL BLOOD FINDING: ICD-10-CM

## 2024-04-10 DIAGNOSIS — M06.4 INFLAMMATORY POLYARTHRITIS (HCC): ICD-10-CM

## 2024-04-10 DIAGNOSIS — Z83.2 FAMILY HISTORY OF AUTOIMMUNE DISORDER: ICD-10-CM

## 2024-04-10 PROCEDURE — 99215 OFFICE O/P EST HI 40 MIN: CPT | Performed by: INTERNAL MEDICINE

## 2024-04-10 RX ORDER — PREDNISONE 5 MG/1
TABLET ORAL
Qty: 90 TABLET | Refills: 2 | Status: SHIPPED | OUTPATIENT
Start: 2024-04-10

## 2024-04-10 RX ORDER — METHOTREXATE 2.5 MG/1
TABLET ORAL
Qty: 20 TABLET | Refills: 2 | Status: SHIPPED | OUTPATIENT
Start: 2024-04-10

## 2024-04-10 RX ORDER — FOLIC ACID 1 MG/1
1 TABLET ORAL DAILY
Qty: 90 TABLET | Refills: 2 | Status: SHIPPED | OUTPATIENT
Start: 2024-04-10

## 2024-04-10 NOTE — PROGRESS NOTES
Please note that the following visit was completed using two-way, real-time interactive audio and/or video communication.  This was done with patient consent.   This has been done in [place] to provide continuity of care in the best interest of the provider-patient relationship, due to the ongoing public health crisis/national emergency and because of restrictions of visitation.  There are limitations of this visit as no physical exam could be performed.  Every conscious effort was taken to allow for sufficient and adequate time.  This billing was spent on reviewing labs, medications, radiology tests and decision making.  Appropriate medical decision-making and tests are ordered as detailed in the plan of care above      RHEUMATOLOGY CLINIC- VIRTUAL FOLLOW UP    Arielle Kidd is a 48 year old female.    ASSESSMENT/PLAN:       ICD-10-CM    1. Seropositive rheumatoid arthritis (HCC)  M05.9     Positive GRISELDA, positive CCP, negative RF      2. Positive GRISELDA (antinuclear antibody)  R76.8       3. Inflammatory polyarthritis (HCC)  M06.4 predniSONE 5 MG Oral Tab      4. Gamma Gap  R79.9 predniSONE 5 MG Oral Tab      5. Family history of autoimmune disorder  Z83.2 predniSONE 5 MG Oral Tab          DISCUSSION:  Patient presents for follow-up with subsequent workup notable for periarticular osteopenia, positive RF, positive CCP, and positive GRISELDA consistent with an underlying inflammatory arthropathy, specifically, double seropositive rheumatoid arthritis.  Patient symptoms significantly responsive to prednisone with lowest effective dose 10 mg daily.  Therefore, discussed with patient can continue 5-10 mg daily until follow-up with me.  For a steroid sparing agent, discussed with/benefits of methotrexate to which she is agreeable (denies family-planning)      PLAN:  - Decrease prednisone to 5-10 mg every morning until follow-up.  -Discussed with patient to start methotrexate 2.5 mg tablets x 5 tabs qw with daily folic  acid 1 mg. Side effects of MTX including possible alopecia, hepatotoxicity, mucositis, GI upset, as well as immunosuppressive effects.  Also, emphasized importance of avoiding EtOH while on MTX.  Explained that MTX may take several weeks to months for noticeable symptom improvement.  - Discussed with patient, given possible immunosuppressive effects of MTX, the importance of keeping vaccinations up-to-date.    - We will monitor labs every 4-6 weeks while on MTX until dosing stabilized.  Then may consider trending labs every 3 months.   - Consult/evaluation communicated with referring physician/provider.    Pt will f/u in 6-8 weeks with repeat labs drawn prior.    Wilder Ward DO  4/10/2024   12:05 PM      Patient has 1 acute on chronic illness that poses a threat to life or bodily function.  I performed an independent interpretation of tests completed by another healthcare professional and am discussing management and test interpretation with and external healthcare professional.    Discussed with patient that they are on chronic treatment with a high-risk medication that requires close lab monitoring for possible drug toxicity.  Additionally, discussed with patient give the possible immunosuppressive effects of their medication as well as their underlying hyper-inflammatory state, the importance of keeping vaccinations and age-appropriate screenings up-to-date, given increased risk of complications and malignancies seen in these patient populations.  Patient to f/u with repeat labs drawn prior (standing labs ordered).  Last QuantiFERON TB testing: 3/15/2024  Last Hepatitis testing: 3/15/2024        SUBJECTIVE:     4/10/24 Follow-Up: Patient reporting significant improvement of symptoms since the p.o. prednisone taper, especially so at the higher doses.  Lowest effective dose appears to be around 10 mg daily with recurrence of symptoms around 5 mg daily.  She presents virtually today to discuss recent lab work and  imaging.    Current Medications:  P.o. prednisone 5 mg daily-prednisone responsive, most response to higher doses.    Medication History:  Prednisone-responsive, patient previously treated for asthma flares with improvement of her arthralgias    PRN Ibuprofen, Naproxen, Tylenol- Ineffective    Interval History:     3/14/24 Initial Consult: I had the pleasure of seeing Arielle Kidd on 3/15/2024 as a new outpatient consultation for positive RF serologies. The patient was originally referred by LISS Ramsay.     48 year old female w/ PMH asthma, goiter who presents to clinic today. Patient originally evaluated at her primary care office for arthralgias/myalgias worsening since December 2023.  She describes back, knee, hand pain with associated swelling described as an aching pain that is worse even while resting.  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  She has AM stiffness for about 1 hour.  Also reports generalized fatigue.  Symptoms are worse at night and patient attempting to treat with as needed ibuprofen, naproxen, Tylenol.  ROS negative for unexplained fever, chills, unintentional weight loss, Raynaud's phenomenon, sicca symptoms (however, patient notes recent complicated dental history given weak and broken teeth), hematuria.  She notes an itchy rash alongside her chest that she first noted during the summertime that comes and goes.  She has a strong family history of autoimmune diseases including a mother with SLE/RA, maternal uncle with SLE, maternal aunts with SLE, and a maternal grandmother with RA.  Interestingly, patient was treated for an asthma exacerbation October 2023 with prednisone 80 mg daily with significant improvement of her symptoms.      HISTORY:  Past Medical History:    Anxiety    Asthma (HCC)      Social Hx Reviewed   Family Hx Reviewed     Medications (Active prior to today's  visit):  Current Outpatient Medications   Medication Sig Dispense Refill    Meloxicam 7.5 MG Oral Tab Take 1 tablet (7.5 mg total) by mouth daily. 30 tablet 0    predniSONE 5 MG Oral Tab Take 4 tablets (20 mg total) by mouth daily for 5 days, THEN 3 tablets (15 mg total) daily for 5 days, THEN 2 tablets (10 mg total) daily for 5 days, THEN 1 tablet (5 mg total) daily. Take in the morning after breakfast.  If symptoms flareup, contact rheumatology office for further dosing instructions.. 75 tablet 0    fluticasone propionate 50 MCG/ACT Nasal Suspension 2 sprays by Each Nare route daily. 1 each 0    cetirizine 10 MG Oral Tab Take 1 tablet (10 mg total) by mouth daily. 30 tablet 0    montelukast 10 MG Oral Tab Take 1 tablet (10 mg total) by mouth daily. 90 tablet 3    SYMBICORT 160-4.5 MCG/ACT Inhalation Aerosol Inhale 2 puffs into the lungs 2 (two) times daily. 10.2 g 2    albuterol 108 (90 Base) MCG/ACT Inhalation Aero Soln Inhale 2 puffs into the lungs every 4 (four) hours as needed for Wheezing. 18 g 2    Spacer/Aero-Holding Chambers Does not apply Device Use with inhaler as needed 1 each 0    PREVIDENT 5000 PLUS 1.1 % Dental Cream          Allergies:  No Known Allergies      ROS:   Review of Systems   Constitutional:  Positive for fatigue. Negative for chills and fever.   HENT:  Negative for congestion, hearing loss, mouth sores, nosebleeds and trouble swallowing.    Eyes:  Negative for photophobia, pain, redness and visual disturbance.   Respiratory:  Negative for cough and shortness of breath.    Cardiovascular:  Negative for chest pain, palpitations and leg swelling.   Gastrointestinal:  Negative for abdominal pain, blood in stool, diarrhea and nausea.   Endocrine: Negative for cold intolerance and heat intolerance.   Genitourinary:  Negative for dysuria, frequency and hematuria.   Musculoskeletal:  Positive for arthralgias, back pain and joint swelling. Negative for gait problem, myalgias, neck pain and neck  stiffness.   Skin:  Positive for rash. Negative for color change.   Neurological:  Negative for dizziness, weakness, numbness and headaches.   Psychiatric/Behavioral:  Negative for confusion and dysphoric mood.         PHYSICAL EXAM:       Physical exam limited due to virtual nature of appointment    Constitutional:  Well developed, Well nourished, No acute distress  HENT:  Normocephalic, Atraumatic  Integument: No erythema, No rash.  Lymphatic:  No lymphadenopathy noted.  Neurologic:  Alert & oriented x 3,  Psychiatric:  Affect normal, Judgment normal, Mood normal.      LABS:     Prior lab work reviewed and notable for:    3/15/2024:  GRISELDA 1: 320 speckled with reflex antibodies negative  dsDNA by IFA less than 10 WNL, dsDNA 2.3 WNL  C4 26.3 WNL, C3 158.6 WNL  CRP less than 0.4 WNL, ESR 24 (high)  CCP greater than 340 (high)    Acute hepatitis panel and TB testing negative    CBC without cytopenias nor leukocytosis  ALT 13 WNL, AST 15 WNL, CR 0.95 WNL  UA with negative blood/protein, 500 leuk esterase but only 6/10 WBC  Monoclonal protein study without evidence of monoclonal protein      1/5/2024:  CTD screen by [facility] equivocal    10/16/2023:  TSH 1.030  CMP with normal renal and hepatic function, gamma gap noted  CBC without cytopenias nor leukocytosis  RF 81 (high)  CRP less than 0.4  Imaging:       3/15/2024 knee x-rays:  L Knee Impression      No acute fracture or dislocation.  No joint effusion.     Mild periarticular osteopenia, nonspecific, can be seen in inflammatory arthropathy.  No joint space narrowing or osteophyte formation.     Soft tissues are unremarkable.          R Knee Impression      No acute fracture or dislocation.  No joint effusion.     Mild peritoneal osteopenia, nonspecific, can be seen with inflammatory arthropathy.  No significant joint space narrowing or osteophyte formation.     Soft tissues are unremarkable.

## 2024-04-15 DIAGNOSIS — M05.79 RHEUMATOID ARTHRITIS INVOLVING MULTIPLE SITES WITH POSITIVE RHEUMATOID FACTOR (HCC): ICD-10-CM

## 2024-04-17 NOTE — TELEPHONE ENCOUNTER
Refill passed per [facility] protocol.    Please see patients MyChart Message       Arielle Kidd  P [facility] Refills2 days ago     AB  Refills have been requested for the following medications:         Meloxicam 7.5 MG Oral Tab [Jenni Ramsay]      Patient Comment: This medicine is not strong enough. It dont work!!         Arielle Kidd  P [facility] Refills2 days ago     AB  Refills have been requested for the following medications:         Meloxicam 7.5 MG Oral Tab [Jenni Ramsay]      Patient Comment: This medicine is not strong enough. It dont work!!           Requested Prescriptions   Pending Prescriptions Disp Refills    Meloxicam 7.5 MG Oral Tab 30 tablet 0     Sig: Take 1 tablet (7.5 mg total) by mouth daily.       Non-Narcotic Pain Medication Protocol Passed - 4/15/2024  8:52 PM        Passed - In person appointment or virtual visit in the past 6 mos or appointment in next 3 mos     Recent Outpatient Visits              1 week ago Seropositive rheumatoid arthritis ([facility])    [facility] Wilder Ward DO    Telemedicine    1 week ago Rheumatoid arthritis involving multiple sites with positive rheumatoid factor ([facility]    [facility] Visit    1 month ago Inflammatory polyarthritis ([facility]    [facility] Visit    2 months ago Productive cough    [facility]    Office Visit    3 months ago Family history of systemic lupus erythematosus    [facility]    [facility]          Future Appointments         Provider Department Appt Notes    In 1 month Wilder Ward DO [facility] followup    In 2 months EC [facility]                       Recent Outpatient Visits              1 week ago Seropositive rheumatoid arthritis ([facility])    [facility] [place] [facility]    [place]medicine    1 week ago Rheumatoid arthritis involving multiple sites with positive rheumatoid factor ([facility])    [facility] [place] [facility]    Office Visit    1 month ago Inflammatory polyarthritis ([facility])    [facility] [address], [place] Wilder Ward DO    Office Visit    2 months ago Productive cough    [facility], [facility], Holly Zacarias APRN    Office Visit    3 months ago Family history of systemic lupus erythematosus    [facility] [place]    Office Visit          Future Appointments         Provider Department Appt Notes    [facility]    [facility] 2 months [facility]

## 2024-04-19 RX ORDER — MELOXICAM 7.5 MG/1
TABLET ORAL DAILY
Refills: 0 | OUTPATIENT
Start: 2024-04-19

## 2024-06-20 ENCOUNTER — LAB ENCOUNTER (OUTPATIENT)
Dept: LAB | Facility: HOSPITAL | Age: 49
End: 2024-06-20
Attending: INTERNAL MEDICINE

## 2024-06-20 ENCOUNTER — HOSPITAL ENCOUNTER (OUTPATIENT)
Dept: GENERAL RADIOLOGY | Facility: HOSPITAL | Age: 49
Discharge: HOME | End: 2024-06-20
Attending: INTERNAL MEDICINE

## 2024-06-20 ENCOUNTER — OFFICE VISIT (OUTPATIENT)
Dept: RHEUMATOLOGY | Facility: CLINIC | Age: 49
End: 2024-06-20

## 2024-06-20 VITALS
HEART RATE: 106 BPM | WEIGHT: 218 LBS | HEIGHT: 64 IN | SYSTOLIC BLOOD PRESSURE: 115 MMHG | BODY MASS INDEX: 37.22 KG/M2 | DIASTOLIC BLOOD PRESSURE: 79 MMHG

## 2024-06-20 DIAGNOSIS — R05.9 COUGH, UNSPECIFIED TYPE: ICD-10-CM

## 2024-06-20 DIAGNOSIS — M05.9 SEROPOSITIVE RHEUMATOID ARTHRITIS (HCC): Primary | ICD-10-CM

## 2024-06-20 DIAGNOSIS — R79.9 ABNORMAL BLOOD FINDING: ICD-10-CM

## 2024-06-20 DIAGNOSIS — M06.4 INFLAMMATORY POLYARTHRITIS (HCC): ICD-10-CM

## 2024-06-20 DIAGNOSIS — Z83.2 FAMILY HISTORY OF AUTOIMMUNE DISORDER: ICD-10-CM

## 2024-06-20 DIAGNOSIS — R76.8 POSITIVE ANA (ANTINUCLEAR ANTIBODY): ICD-10-CM

## 2024-06-20 DIAGNOSIS — M05.9 SEROPOSITIVE RHEUMATOID ARTHRITIS (HCC): ICD-10-CM

## 2024-06-20 LAB
ALT SERPL-CCNC: 16 U/L
AST SERPL-CCNC: 15 U/L (ref ?–34)
BASOPHILS # BLD AUTO: 0.09 X10(3) UL (ref 0–0.2)
BASOPHILS NFR BLD AUTO: 0.9 %
CREAT BLD-MCNC: 0.93 MG/DL
CRP SERPL-MCNC: <0.4 MG/DL (ref ?–1)
DEPRECATED RDW RBC AUTO: 46 FL (ref 35.1–46.3)
EGFRCR SERPLBLD CKD-EPI 2021: 75 ML/MIN/1.73M2 (ref 60–?)
EOSINOPHIL # BLD AUTO: 0.13 X10(3) UL (ref 0–0.7)
EOSINOPHIL NFR BLD AUTO: 1.3 %
ERYTHROCYTE [DISTWIDTH] IN BLOOD BY AUTOMATED COUNT: 13.6 % (ref 11–15)
ERYTHROCYTE [SEDIMENTATION RATE] IN BLOOD: 53 MM/HR
HCT VFR BLD AUTO: 39.4 %
HGB BLD-MCNC: 12.9 G/DL
IMM GRANULOCYTES # BLD AUTO: 0.03 X10(3) UL (ref 0–1)
IMM GRANULOCYTES NFR BLD: 0.3 %
LYMPHOCYTES # BLD AUTO: 3.23 X10(3) UL (ref 1–4)
LYMPHOCYTES NFR BLD AUTO: 33.3 %
MCH RBC QN AUTO: 30.2 PG (ref 26–34)
MCHC RBC AUTO-ENTMCNC: 32.7 G/DL (ref 31–37)
MCV RBC AUTO: 92.3 FL
MONOCYTES # BLD AUTO: 0.86 X10(3) UL (ref 0.1–1)
MONOCYTES NFR BLD AUTO: 8.9 %
NEUTROPHILS # BLD AUTO: 5.35 X10 (3) UL (ref 1.5–7.7)
NEUTROPHILS # BLD AUTO: 5.35 X10(3) UL (ref 1.5–7.7)
NEUTROPHILS NFR BLD AUTO: 55.3 %
PLATELET # BLD AUTO: 262 10(3)UL (ref 150–450)
RBC # BLD AUTO: 4.27 X10(6)UL
WBC # BLD AUTO: 9.7 X10(3) UL (ref 4–11)

## 2024-06-20 PROCEDURE — 82565 ASSAY OF CREATININE: CPT

## 2024-06-20 PROCEDURE — 85025 COMPLETE CBC W/AUTO DIFF WBC: CPT

## 2024-06-20 PROCEDURE — 36415 COLL VENOUS BLD VENIPUNCTURE: CPT

## 2024-06-20 PROCEDURE — 85652 RBC SED RATE AUTOMATED: CPT

## 2024-06-20 PROCEDURE — 86140 C-REACTIVE PROTEIN: CPT

## 2024-06-20 PROCEDURE — 71046 X-RAY EXAM CHEST 2 VIEWS: CPT | Performed by: INTERNAL MEDICINE

## 2024-06-20 PROCEDURE — 99215 OFFICE O/P EST HI 40 MIN: CPT | Performed by: INTERNAL MEDICINE

## 2024-06-20 PROCEDURE — 84450 TRANSFERASE (AST) (SGOT): CPT

## 2024-06-20 PROCEDURE — 84460 ALANINE AMINO (ALT) (SGPT): CPT

## 2024-06-20 RX ORDER — PREDNISONE 5 MG/1
5 TABLET ORAL DAILY PRN
COMMUNITY
Start: 2024-06-20

## 2024-06-20 RX ORDER — METHOTREXATE 2.5 MG/1
TABLET ORAL
Qty: 28 TABLET | Refills: 2 | Status: SHIPPED | OUTPATIENT
Start: 2024-06-20

## 2024-06-20 RX ORDER — AMOXICILLIN 500 MG/1
500 CAPSULE ORAL EVERY 8 HOURS
COMMUNITY
Start: 2024-06-10

## 2024-06-20 NOTE — PROGRESS NOTES
RHEUMATOLOGY CLINIC- FOLLOW UP    Arielle Kidd is a 49 year old female.    ASSESSMENT/PLAN:       ICD-10-CM    1. Seropositive rheumatoid arthritis (HCC)  M05.9 XR CHEST PA + LAT CHEST (CPT=71046)      2. Positive GRISELDA (antinuclear antibody)  R76.8 XR CHEST PA + LAT CHEST (CPT=71046)      3. Inflammatory polyarthritis (HCC)  M06.4 XR CHEST PA + LAT CHEST (CPT=71046)      4. Cough, unspecified type  R05.9 XR CHEST PA + LAT CHEST (CPT=71046)        DISCUSSION:  Patient presents for follow-up for seropositive RA (positive GRISELDA, CCP, RF, with periarticular osteopenia) doing overall better on methotrexate.  Synovitis improved on exam and patient did have a couple days off prednisone with overall tolerable arthralgias.  Therefore, discussed with patient changing prednisone to as needed dosing and increasing methotrexate while closely monitoring her response.  She is also due for labs so plans to have this done today.  Also has a lingering cough since her dental surgery so we will check a chest x-ray.    PLAN:  -Check CXR given cough  - PRN Prednisone 5 mg qAM for breakthrough pain  -Increase Methotrexate as follows: methotrexate 2.5 mg tablets x 7 tabs qw with daily folic acid 1 mg. Side effects of MTX discussed including possible alopecia, hepatotoxicity, mucositis, GI upset, as well as immunosuppressive effects.  Also, emphasized importance of avoiding EtOH while on MTX.  Explained that MTX may take several weeks to months for noticeable symptom improvement.  - Discussed with patient, given possible immunosuppressive effects of MTX, the importance of keeping vaccinations up-to-date.    - We will monitor labs every 4-6 weeks while on MTX until dosing stabilized.  Then may consider trending labs every 3 months.   - Consult/evaluation communicated with referring physician/provider.    I will MyChart patient on receipt of above results.  Otherwise, patient to follow-up in about 6-8 weeks with repeat labs drawn  prior.    Wilder WardDO  6/20/2024   11:39 AM  There is a longitudinal care relationship with me, the care plan reflects the ongoing nature of the continuous relationship of care, and the medical record indicates that there is ongoing treatment of a serious/complex medical condition which I am currently managing.  is Applicable.     Discussed with patient that they are on chronic treatment with a high-risk medication that requires close lab monitoring for possible drug toxicity.  Additionally, discussed with patient give the possible immunosuppressive effects of their medication as well as their underlying hyper-inflammatory state, the importance of keeping vaccinations and age-appropriate screenings up-to-date, given increased risk of complications and malignancies seen in these patient populations.  Patient to f/u with repeat labs drawn prior (standing labs ordered).  Last QuantiFERON TB testing: 3/15/2024  Last Hepatitis testing: 3/15/2024        SUBJECTIVE:     6/20/24 Follow-Up: Patient recently had dental surgery related to dentures and is on antibiotics so held her last dose of methotrexate.  Otherwise, notes overall improvement in her arthralgias since starting methotrexate.  She did hold her prednisone dose for a couple days given her mouth soreness but noted the arthralgias did not flareup significantly.    Current Medications:  Methotrexate 2.5 mg tablets x 5 tablets once weekly with folic acid 1 mg daily-started at 5 tablets once weekly 4/10/2024  P.o. prednisone 10 mg daily-prednisone responsive, most response to higher doses.    Medication History:  Prednisone-responsive, patient previously treated for asthma flares with improvement of her arthralgias    PRN Ibuprofen, Naproxen, Tylenol- Ineffective    Interval History:     3/14/24 Initial Consult: I had the pleasure of seeing Arielle Kidd on 3/15/2024 as a new outpatient consultation for positive RF serologies. The patient was originally  referred by LISS Ramsay.     48 year old female w/ PMH asthma, goiter who presents to clinic today. Patient originally evaluated at her primary care office for arthralgias/myalgias worsening since December 2023.  She describes back, knee, hand pain with associated swelling described as an aching pain that is worse even while resting.  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  She has AM stiffness for about 1 hour.  Also reports generalized fatigue.  Symptoms are worse at night and patient attempting to treat with as needed ibuprofen, naproxen, Tylenol.  ROS negative for unexplained fever, chills, unintentional weight loss, Raynaud's phenomenon, sicca symptoms (however, patient notes recent complicated dental history given weak and broken teeth), hematuria.  She notes an itchy rash alongside her chest that she first noted during the summertime that comes and goes.  She has a strong family history of autoimmune diseases including a mother with SLE/RA, maternal uncle with SLE, maternal aunts with SLE, and a maternal grandmother with RA.  Interestingly, patient was treated for an asthma exacerbation October 2023 with prednisone 80 mg daily with significant improvement of her symptoms.        4/10/24 virtual Follow-Up: Patient reporting significant improvement of symptoms since the p.o. prednisone taper, especially so at the higher doses.  Lowest effective dose appears to be around 10 mg daily with recurrence of symptoms around 5 mg daily.  She presents virtually today to discuss recent lab work and imaging.      HISTORY:  Past Medical History:    Anxiety    Asthma ([facility])      Social Hx Reviewed   Family Hx Reviewed     Medications (Active prior to today's visit):  Current Outpatient Medications   Medication Sig Dispense Refill    amoxicillin 500 MG Oral Cap Take 1 capsule (500 mg total) by mouth every 8 (eight) hours.       predniSONE 5 MG Oral Tab Take 5 mg every morning after breakfast.  If symptoms persist, may take up to 10 mg every morning. 90 tablet 2    methotrexate 2.5 MG Oral Tab Take 5 tablets of Methotrexate 2.5 mg once weekly with food. 20 tablet 2    folic acid 1 MG Oral Tab Take 1 tablet (1 mg total) by mouth daily. 90 tablet 2    fluticasone propionate 50 MCG/ACT Nasal Suspension 2 sprays by Each Nare route daily. 1 each 0    cetirizine 10 MG Oral Tab Take 1 tablet (10 mg total) by mouth daily. 30 tablet 0    montelukast 10 MG Oral Tab Take 1 tablet (10 mg total) by mouth daily. 90 tablet 3    SYMBICORT 160-4.5 MCG/ACT Inhalation Aerosol Inhale 2 puffs into the lungs 2 (two) times daily. 10.2 g 2    albuterol 108 (90 Base) MCG/ACT Inhalation Aero Soln Inhale 2 puffs into the lungs every 4 (four) hours as needed for Wheezing. 18 g 2    PREVIDENT 5000 PLUS 1.1 % Dental Cream       Meloxicam 7.5 MG Oral Tab Take 1 tablet (7.5 mg total) by mouth daily. (Patient not taking: Reported on 6/20/2024) 30 tablet 0    Spacer/Aero-Holding Chambers Does not apply Device Use with inhaler as needed 1 each 0       Allergies:  No Known Allergies      ROS:   Review of Systems   Constitutional:  Negative for chills, fatigue and fever.   HENT:  Negative for congestion, hearing loss, mouth sores, nosebleeds and trouble swallowing.    Eyes:  Negative for photophobia, pain, redness and visual disturbance.   Respiratory:  Negative for cough and shortness of breath.    Cardiovascular:  Negative for chest pain, palpitations and leg swelling.   Gastrointestinal:  Negative for abdominal pain, blood in stool, diarrhea and nausea.   Endocrine: Negative for cold intolerance and heat intolerance.   Genitourinary:  Negative for dysuria, frequency and hematuria.   Musculoskeletal:  Positive for arthralgias. Negative for back pain, gait problem, joint swelling, myalgias, neck pain and neck stiffness.   Skin:  Negative for color change and rash.    Neurological:  Negative for dizziness, weakness, numbness and headaches.   Psychiatric/Behavioral:  Negative for confusion and dysphoric mood.         PHYSICAL EXAM:     Constitutional:  Well developed, Well nourished, No acute distress  HENT:  Normocephalic, Atraumatic, Bilateral external ears normal, Oropharynx moist, No oral exudates.  Neck: Normal range of motion, No tenderness, Supple, No stridor.  Eyes:  PERRL, EOMI, Conjunctiva normal, No discharge.  Respiratory:  Normal breath sounds, No respiratory distress, No wheezing.  Cardiovascular:  Normal heart rate, Normal rhythm, No murmurs, No rubs, No gallops.  GI:  Bowel sounds normal, Soft, No tenderness, No masses, No pulsatile masses.  : No CVA tenderness.  Musculoskeletal:  Good range of motion in all major joints. A comprehensive 28 count joint exam was done and was negative for swelling or tenderness except as noted. Inspections for misalignment, asymmetry, crepitation, defects, tenderness, masses, nodules, effusions, range of motion, and stability in the upper and lower extremities bilaterally are all normal unless noted.           Integument:  Warm, Dry, No erythema, No rash.  Lymphatic:  No lymphadenopathy noted.  Neurologic:  Alert & oriented x 3, Normal motor function, Normal sensory function, No focal deficits noted.  Psychiatric:  Affect normal, Judgment normal, Mood normal.      LABS:     Prior lab work reviewed and notable for:    3/15/2024:  GRISELDA 1: 320 speckled with reflex antibodies negative  dsDNA by IFA less than 10 WNL, dsDNA 2.3 WNL  C4 26.3 WNL, C3 158.6 WNL  CRP less than 0.4 WNL, ESR 24 (high)  CCP greater than 340 (high)    Acute hepatitis panel and TB testing negative    CBC without cytopenias nor leukocytosis  ALT 13 WNL, AST 15 WNL, CR 0.95 WNL  UA with negative blood/protein, 500 leuk esterase but only 6/10 WBC  Monoclonal protein study without evidence of monoclonal protein      1/5/2024:  CTD screen by [facility]ON[facility]  equivocal    10/16/2023:  TSH 1.030  CMP with normal renal and hepatic function, gamma gap noted  CBC without cytopenias nor leukocytosis  RF 81 (high)  CRP less than 0.4  Imaging:       3/15/2024 knee x-rays:  L Knee Impression      No acute fracture or dislocation.  No joint effusion.     Mild periarticular osteopenia, nonspecific, can be seen in inflammatory arthropathy.  No joint space narrowing or osteophyte formation.     Soft tissues are unremarkable.          R Knee Impression      No acute fracture or dislocation.  No joint effusion.     Mild peritoneal osteopenia, nonspecific, can be seen with inflammatory arthropathy.  No significant joint space narrowing or osteophyte formation.     Soft tissues are unremarkable.

## 2024-07-10 ENCOUNTER — OFFICE VISIT (OUTPATIENT)
Dept: FAMILY MEDICINE CLINIC | Facility: CLINIC | Age: 49
End: 2024-07-10
Payer: MEDICAID

## 2024-07-10 VITALS
DIASTOLIC BLOOD PRESSURE: 80 MMHG | OXYGEN SATURATION: 99 % | RESPIRATION RATE: 22 BRPM | HEIGHT: 64 IN | SYSTOLIC BLOOD PRESSURE: 137 MMHG | HEART RATE: 106 BPM | TEMPERATURE: 99 F | WEIGHT: 218 LBS | BODY MASS INDEX: 37.22 KG/M2

## 2024-07-10 DIAGNOSIS — R06.02 SHORTNESS OF BREATH: ICD-10-CM

## 2024-07-10 DIAGNOSIS — R52 COMPLAINTS OF TOTAL BODY PAIN: Primary | ICD-10-CM

## 2024-07-10 PROCEDURE — 99215 OFFICE O/P EST HI 40 MIN: CPT | Performed by: NURSE PRACTITIONER

## 2024-07-10 NOTE — PROGRESS NOTES
Pt presented with sob and body pain for 1 day    Reports yesterday evening she started feeling severe joint and body pain. She took Prednisone and Ibuprofen with minimal relief. She does have history of Lupus and believes this is a flare up. She started feeling shortness of breath and chest discomfort when walking up the stairs yesterday. Denies cough but is spitting up green mucus.     /80 (BP Location: Left arm, Patient Position: Sitting, Cuff Size: adult)   Pulse 106   Temp 98.9 °F (37.2 °C)   Resp 22   Ht 5' 4\" (1.626 m)   Wt 218 lb (98.9 kg)   LMP  (LMP Unknown)   SpO2 99%   BMI 37.42 kg/m²     GEN: alert, no distress  HEART: Tachy, no murmur  LUNGS: Clear. Breathing non labored.     Accompanied by: self  After triage, a higher acuity of care was recommended to pt.  Discussed limitations of WIC and need for further evaluation  Referred to: [facility];  Patient verbalized understanding of rationale for further evaluation and was stable upon discharge.  No visit charge.

## 2024-08-28 ENCOUNTER — OFFICE VISIT (OUTPATIENT)
Dept: FAMILY MEDICINE CLINIC | Facility: CLINIC | Age: 49
End: 2024-08-28

## 2024-08-28 VITALS
OXYGEN SATURATION: 94 % | BODY MASS INDEX: 38.93 KG/M2 | HEIGHT: 64 IN | WEIGHT: 228 LBS | SYSTOLIC BLOOD PRESSURE: 134 MMHG | HEART RATE: 109 BPM | DIASTOLIC BLOOD PRESSURE: 76 MMHG

## 2024-08-28 DIAGNOSIS — M05.79 RHEUMATOID ARTHRITIS INVOLVING MULTIPLE SITES WITH POSITIVE RHEUMATOID FACTOR (HCC): ICD-10-CM

## 2024-08-28 DIAGNOSIS — Z30.42 ON DEPO-PROVERA FOR CONTRACEPTION: Primary | ICD-10-CM

## 2024-08-28 DIAGNOSIS — Z51.81 MEDICATION MONITORING ENCOUNTER: ICD-10-CM

## 2024-08-28 PROCEDURE — 99213 OFFICE O/P EST LOW 20 MIN: CPT

## 2024-08-28 PROCEDURE — 96372 THER/PROPH/DIAG INJ SC/IM: CPT

## 2024-08-28 PROCEDURE — 81025 URINE PREGNANCY TEST: CPT

## 2024-08-28 RX ORDER — MEDROXYPROGESTERONE ACETATE 150 MG/ML
150 INJECTION, SUSPENSION INTRAMUSCULAR ONCE
Status: COMPLETED | OUTPATIENT
Start: 2024-08-28 | End: 2024-08-28

## 2024-08-28 RX ORDER — MELOXICAM 15 MG/1
15 TABLET ORAL DAILY
Qty: 30 TABLET | Refills: 0 | Status: SHIPPED | OUTPATIENT
Start: 2024-08-28

## 2024-08-28 RX ADMIN — MEDROXYPROGESTERONE ACETATE 150 MG: 150 INJECTION, SUSPENSION INTRAMUSCULAR at 17:14:00

## 2024-08-28 NOTE — PROGRESS NOTES
HPI:    Patient ID: Arielle Kidd is a 49 year old female.    HPI     Patient here in office for follow-up regarding rheumatoid arthritis and severe pain in bilateral knees.  Was seen by rheumatology on 6/20/2024 and started on methotrexate 2.5 mg, take 7 tablets once weekly with food.  Also given prescription for prednisone 5 mg daily as needed (previously taking 5-10 mg daily).  Pain in knees aggravated with activity.  Per patient, she attempted to take prednisone 5 mg as needed but has to take 10 mg daily due to increased pain.  Also taking Tylenol 3000 mg/daily.  Patient feels that methotrexate not assisting with her pain.  Scheduled for follow-up with rheumatology on 9/14/2024.    Requesting Depo-Provera injection.      Review of Systems   Constitutional: Negative.    Respiratory: Negative.     Cardiovascular: Negative.    Musculoskeletal:  Positive for arthralgias.   Skin: Negative.    Neurological: Negative.    Psychiatric/Behavioral: Negative.              Current Outpatient Medications   Medication Sig Dispense Refill    Meloxicam 15 MG Oral Tab Take 1 tablet (15 mg total) by mouth daily. 30 tablet 0    methotrexate 2.5 MG Oral Tab Take 7 tablets of Methotrexate 2.5 mg once weekly with food. 28 tablet 2    predniSONE 5 MG Oral Tab Take 1 tablet (5 mg total) by mouth daily as needed. Take with breakfast as needed in the morning for breakthrough pain.      folic acid 1 MG Oral Tab Take 1 tablet (1 mg total) by mouth daily. 90 tablet 2    montelukast 10 MG Oral Tab Take 1 tablet (10 mg total) by mouth daily. 90 tablet 3    SYMBICORT 160-4.5 MCG/ACT Inhalation Aerosol Inhale 2 puffs into the lungs 2 (two) times daily. 10.2 g 2    albuterol 108 (90 Base) MCG/ACT Inhalation Aero Soln Inhale 2 puffs into the lungs every 4 (four) hours as needed for Wheezing. 18 g 2    PREVIDENT 5000 PLUS 1.1 % Dental Cream       fluticasone propionate 50 MCG/ACT Nasal Suspension 2 sprays by Each Nare route daily. (Patient not  taking: Reported on 8/28/2024) 1 each 0    cetirizine 10 MG Oral Tab Take 1 tablet (10 mg total) by mouth daily. (Patient not taking: Reported on 7/10/2024) 30 tablet 0    Spacer/Aero-Holding Chambers Does not apply Device Use with inhaler as needed (Patient not taking: Reported on 8/28/2024) 1 each 0     Allergies:No Known Allergies   /76 (BP Location: Right arm, Patient Position: Sitting, Cuff Size: large)   Pulse 109   Ht 5' 4\" (1.626 m)   Wt 228 lb (103.4 kg)   LMP  (LMP Unknown)   SpO2 94%   BMI 39.14 kg/m²   Body mass index is 39.14 kg/m².  PHYSICAL EXAM:   Physical Exam  Vitals reviewed.   Constitutional:       Appearance: Normal appearance.   Cardiovascular:      Rate and Rhythm: Normal rate and regular rhythm.      Heart sounds: Normal heart sounds. No murmur heard.     No friction rub. No gallop.   Pulmonary:      Effort: Pulmonary effort is normal. No respiratory distress.      Breath sounds: Normal breath sounds. No stridor. No wheezing, rhonchi or rales.   Chest:      Chest wall: No tenderness.   Skin:     General: Skin is warm.      Findings: No rash.   Neurological:      General: No focal deficit present.      Mental Status: She is alert and oriented to person, place, and time.   Psychiatric:         Mood and Affect: Mood normal.         Behavior: Behavior normal.         Thought Content: Thought content normal.         Judgment: Judgment normal.                ASSESSMENT/PLAN:   1. On Depo-Provera for contraception  -Urine pregnancy test negative  - POC Urine pregnancy test [44068]  - medroxyPROGESTERone (Depo-Provera) 150 MG/ML injection 150 mg    2. Rheumatoid arthritis involving multiple sites with positive rheumatoid factor (HCC)  -Continue present management with rheumatology  - Will give prescription for meloxicam 15 mg daily.  Advised patient that meloxicam combined with methotrexate increases risk of gastrointestinal bleeding.  Encourage patient to use medication sparingly and  stop treatment if she develops blood in stool  - Hemoglobin A1C [E]; Future  - Comp Metabolic Panel (14); Future    3. Medication monitoring encounte  -Labs ordered as listed below due to long-term prednisone use  - Hemoglobin A1C [E]; Future  - Comp Metabolic Panel (14); Future      Orders Placed This Encounter   Procedures    POC Urine pregnancy test [80887]    Hemoglobin A1C [E]    Comp Metabolic Panel (14)       Meds This Visit:  Requested Prescriptions     Signed Prescriptions Disp Refills    Meloxicam 15 MG Oral Tab 30 tablet 0     Sig: Take 1 tablet (15 mg total) by mouth daily.       Imaging & Referrals:  None         ID#[id number]

## 2024-10-01 ENCOUNTER — TELEPHONE (OUTPATIENT)
Dept: RHEUMATOLOGY | Facility: CLINIC | Age: 49
End: 2024-10-01

## 2024-10-01 DIAGNOSIS — J45.20 MILD INTERMITTENT ASTHMA, UNSPECIFIED WHETHER COMPLICATED (HCC): ICD-10-CM

## 2024-10-01 RX ORDER — FOLIC ACID 1 MG/1
1 TABLET ORAL DAILY
Qty: 90 TABLET | Refills: 2 | Status: CANCELLED | OUTPATIENT
Start: 2024-10-01

## 2024-10-01 RX ORDER — FOLIC ACID 1 MG/1
1 TABLET ORAL DAILY
Qty: 90 TABLET | Refills: 2 | Status: SHIPPED | OUTPATIENT
Start: 2024-10-01

## 2024-10-01 RX ORDER — METHOTREXATE 2.5 MG/1
TABLET ORAL
Qty: 28 TABLET | Refills: 0 | Status: SHIPPED | OUTPATIENT
Start: 2024-10-01

## 2024-10-01 NOTE — TELEPHONE ENCOUNTER
Requested Prescriptions     Pending Prescriptions Disp Refills    methotrexate 2.5 MG Oral Tab 28 tablet 2     Sig: Take 7 tablets of Methotrexate 2.5 mg once weekly with food.    folic acid 1 MG Oral Tab 90 tablet 2     Sig: Take 1 tablet (1 mg total) by mouth daily.     LOV: 6/20/24  Future Appointments   Date Time Provider Department Center   10/4/2024 10:00 AM Jenni Ramsay APRN [facility]   10/5/2024  7:40 AM PFS [facility]   10/23/2024 10:30 AM Wilder Ward,  [facility]     Labs:   Component      Latest Ref Rng 6/20/2024   WBC      4.0 - 11.0 x10(3) uL 9.7    RBC      3.80 - 5.30 x10(6)uL 4.27    Hemoglobin      12.0 - 16.0 g/dL 12.9    Hematocrit      35.0 - 48.0 % 39.4    MCV      80.0 - 100.0 fL 92.3    MCH      26.0 - 34.0 pg 30.2    MCHC      31.0 - 37.0 g/dL 32.7    RDW-SD      35.1 - 46.3 fL 46.0    RDW      11.0 - 15.0 % 13.6    Platelet Count      150.0 - 450.0 10(3)uL 262.0    Prelim Neutrophil Abs      1.50 - 7.70 x10 (3) uL 5.35    Neutrophils Absolute      1.50 - 7.70 x10(3) uL 5.35    Lymphocytes Absolute      1.00 - 4.00 x10(3) uL 3.23    Monocytes Absolute      0.10 - 1.00 x10(3) uL 0.86    Eosinophils Absolute      0.00 - 0.70 x10(3) uL 0.13    Basophils Absolute      0.00 - 0.20 x10(3) uL 0.09    Immature Granulocyte Absolute      0.00 - 1.00 x10(3) uL 0.03    Neutrophils %      % 55.3    Lymphocytes %      % 33.3    Monocytes %      % 8.9    Eosinophils %      % 1.3    Basophils %      % 0.9    Immature Granulocyte %      % 0.3    CREATININE      0.55 - 1.02 mg/dL 0.93    EGFR      >=60 mL/min/1.73m2 75    AST (SGOT)      <=34 U/L 15    ALT (SGPT)      10 - 49 U/L 16    SED RATE      0 - 20 mm/Hr 53 (H)    C-REACTIVE PROTEIN      <1.00 mg/dL <0.40       Legend:  (H) High    PLAN:  -Check CXR given cough  - PRN Prednisone 5 mg qAM for breakthrough pain  -Increase Methotrexate as follows: methotrexate 2.5 mg tablets x 7 tabs qw with daily folic acid 1  mg. Side effects of MTX discussed including possible alopecia, hepatotoxicity, mucositis, GI upset, as well as immunosuppressive effects.  Also, emphasized importance of avoiding EtOH while on MTX.  Explained that MTX may take several weeks to months for noticeable symptom improvement.  - Discussed with patient, given possible immunosuppressive effects of MTX, the importance of keeping vaccinations up-to-date.    - We will monitor labs every 4-6 weeks while on MTX until dosing stabilized.  Then may consider trending labs every 3 months.   - Consult/evaluation communicated with referring physician/provider.     I will MyChart patient on receipt of above results.  Otherwise, patient to follow-up in about 6-8 weeks with repeat labs drawn prior.     Wilder Ward DO  6/20/2024   11:39 AM

## 2024-10-01 NOTE — TELEPHONE ENCOUNTER
[employer] message sent:  Hello-I received a request from your pharmacy regarding a methotrexate and folic acid refill.  I sent in a limited refill just now.  As reminder, please have labs drawn at your convenience for additional refills, as I want a make sure that you remain safe on this medication especially with the recently increased dose of methotrexate.  Thank you.

## 2024-10-02 ENCOUNTER — HOSPITAL ENCOUNTER (EMERGENCY)
Facility: HOSPITAL | Age: 49
Discharge: HOME | End: 2024-10-02
Attending: EMERGENCY MEDICINE
Payer: MEDICAID

## 2024-10-02 VITALS
HEIGHT: 64 IN | TEMPERATURE: 99 F | SYSTOLIC BLOOD PRESSURE: 107 MMHG | DIASTOLIC BLOOD PRESSURE: 56 MMHG | WEIGHT: 225 LBS | RESPIRATION RATE: 19 BRPM | BODY MASS INDEX: 38.41 KG/M2 | HEART RATE: 91 BPM | OXYGEN SATURATION: 95 %

## 2024-10-02 DIAGNOSIS — H81.10 BENIGN PAROXYSMAL POSITIONAL VERTIGO, UNSPECIFIED LATERALITY: Primary | ICD-10-CM

## 2024-10-02 DIAGNOSIS — R11.0 NAUSEA: ICD-10-CM

## 2024-10-02 LAB
ALBUMIN SERPL-MCNC: 4.2 G/DL (ref 3.2–4.8)
ALBUMIN/GLOB SERPL: 1.2 {RATIO} (ref 1–2)
ALP LIVER SERPL-CCNC: 69 U/L
ALT SERPL-CCNC: 10 U/L
ANION GAP SERPL CALC-SCNC: 6 MMOL/L (ref 0–18)
AST SERPL-CCNC: 15 U/L (ref ?–34)
ATRIAL RATE: 87 BPM
BASOPHILS # BLD AUTO: 0.05 X10(3) UL (ref 0–0.2)
BASOPHILS NFR BLD AUTO: 0.7 %
BILIRUB SERPL-MCNC: 0.3 MG/DL (ref 0.3–1.2)
BUN BLD-MCNC: 10 MG/DL (ref 9–23)
BUN/CREAT SERPL: 11.1 (ref 10–20)
CALCIUM BLD-MCNC: 9.4 MG/DL (ref 8.7–10.4)
CHLORIDE SERPL-SCNC: 114 MMOL/L (ref 98–112)
CO2 SERPL-SCNC: 23 MMOL/L (ref 21–32)
CREAT BLD-MCNC: 0.9 MG/DL
DEPRECATED RDW RBC AUTO: 42.1 FL (ref 35.1–46.3)
EGFRCR SERPLBLD CKD-EPI 2021: 78 ML/MIN/1.73M2 (ref 60–?)
EOSINOPHIL # BLD AUTO: 0.19 X10(3) UL (ref 0–0.7)
EOSINOPHIL NFR BLD AUTO: 2.7 %
ERYTHROCYTE [DISTWIDTH] IN BLOOD BY AUTOMATED COUNT: 12.7 % (ref 11–15)
GLOBULIN PLAS-MCNC: 3.4 G/DL (ref 2–3.5)
GLUCOSE BLD-MCNC: 121 MG/DL (ref 70–99)
HCT VFR BLD AUTO: 38.5 %
HGB BLD-MCNC: 13.2 G/DL
IMM GRANULOCYTES # BLD AUTO: 0.03 X10(3) UL (ref 0–1)
IMM GRANULOCYTES NFR BLD: 0.4 %
LYMPHOCYTES # BLD AUTO: 2.1 X10(3) UL (ref 1–4)
LYMPHOCYTES NFR BLD AUTO: 29.5 %
MAGNESIUM SERPL-MCNC: 2 MG/DL (ref 1.6–2.6)
MCH RBC QN AUTO: 31.3 PG (ref 26–34)
MCHC RBC AUTO-ENTMCNC: 34.3 G/DL (ref 31–37)
MCV RBC AUTO: 91.2 FL
MONOCYTES # BLD AUTO: 0.71 X10(3) UL (ref 0.1–1)
MONOCYTES NFR BLD AUTO: 10 %
NEUTROPHILS # BLD AUTO: 4.04 X10 (3) UL (ref 1.5–7.7)
NEUTROPHILS # BLD AUTO: 4.04 X10(3) UL (ref 1.5–7.7)
NEUTROPHILS NFR BLD AUTO: 56.7 %
OSMOLALITY SERPL CALC.SUM OF ELEC: 296 MOSM/KG (ref 275–295)
P-R INTERVAL: 126 MS
PLATELET # BLD AUTO: 195 10(3)UL (ref 150–450)
POTASSIUM SERPL-SCNC: 3.6 MMOL/L (ref 3.5–5.1)
PROT SERPL-MCNC: 7.6 G/DL (ref 5.7–8.2)
Q-T INTERVAL: 340 MS
QRS DURATION: 86 MS
QTC CALCULATION (BEZET): 409 MS
R AXIS: 190 DEGREES
RBC # BLD AUTO: 4.22 X10(6)UL
SODIUM SERPL-SCNC: 143 MMOL/L (ref 136–145)
T AXIS: 173 DEGREES
VENTRICULAR RATE: 87 BPM
WBC # BLD AUTO: 7.1 X10(3) UL (ref 4–11)

## 2024-10-02 PROCEDURE — 99284 EMERGENCY DEPT VISIT MOD MDM: CPT

## 2024-10-02 PROCEDURE — 96374 THER/PROPH/DIAG INJ IV PUSH: CPT

## 2024-10-02 PROCEDURE — 85025 COMPLETE CBC W/AUTO DIFF WBC: CPT | Performed by: EMERGENCY MEDICINE

## 2024-10-02 PROCEDURE — 93010 ELECTROCARDIOGRAM REPORT: CPT

## 2024-10-02 PROCEDURE — 93005 ELECTROCARDIOGRAM TRACING: CPT

## 2024-10-02 PROCEDURE — 96375 TX/PRO/DX INJ NEW DRUG ADDON: CPT

## 2024-10-02 PROCEDURE — 80053 COMPREHEN METABOLIC PANEL: CPT | Performed by: EMERGENCY MEDICINE

## 2024-10-02 PROCEDURE — 83735 ASSAY OF MAGNESIUM: CPT | Performed by: EMERGENCY MEDICINE

## 2024-10-02 RX ORDER — ONDANSETRON 2 MG/ML
4 INJECTION INTRAMUSCULAR; INTRAVENOUS ONCE
Status: COMPLETED | OUTPATIENT
Start: 2024-10-02 | End: 2024-10-02

## 2024-10-02 RX ORDER — MELOXICAM 15 MG/1
15 TABLET ORAL DAILY
Qty: 30 TABLET | Refills: 0 | OUTPATIENT
Start: 2024-10-02

## 2024-10-02 RX ORDER — KETOROLAC TROMETHAMINE 15 MG/ML
15 INJECTION, SOLUTION INTRAMUSCULAR; INTRAVENOUS ONCE
Status: COMPLETED | OUTPATIENT
Start: 2024-10-02 | End: 2024-10-02

## 2024-10-02 RX ORDER — MECLIZINE HYDROCHLORIDE 25 MG/1
25 TABLET ORAL ONCE
Status: COMPLETED | OUTPATIENT
Start: 2024-10-02 | End: 2024-10-02

## 2024-10-02 RX ORDER — MONTELUKAST SODIUM 10 MG/1
10 TABLET ORAL DAILY
Qty: 90 TABLET | Refills: 3 | Status: SHIPPED | OUTPATIENT
Start: 2024-10-02

## 2024-10-02 RX ORDER — MELOXICAM 15 MG/1
15 TABLET ORAL DAILY
Qty: 30 TABLET | Refills: 1 | Status: SHIPPED | OUTPATIENT
Start: 2024-10-02

## 2024-10-02 NOTE — ED PROVIDER NOTES
Patient Seen in: [facility] Emergency Department    History     Chief Complaint   Patient presents with    Dizziness       HPI    49-year-old female presents ER with complaint of dizziness and nausea since yesterday.  Patient states \"I think the room is spinning.\"  Patient states that dizziness positional only when she stands up.  Patient with steady gait.  Patient without any neurological deficits or slurring of speech.    History reviewed.   Past Medical History:    Anxiety    Asthma (HCC)       History reviewed. History reviewed. No pertinent surgical history.      Medications :  (Not in a hospital admission)       Family History   Problem Relation Age of Onset    Hypertension Mother     Diabetes Mother     Other (Other) Mother         lupus    Other (Other) Daughter         lupus     Other (Other) Maternal Grandmother         RA    Hypertension Maternal Grandmother     Asthma Brother     Other (Other) Other         lupus        Smoking Status:   Social History     Socioeconomic History    Marital status: OTHER   Tobacco Use    Smoking status: Never    Smokeless tobacco: Never   Vaping Use    Vaping status: Former   Substance and Sexual Activity    Alcohol use: Yes     Comment: occasionally     Drug use: Never       ROS  All pertinent positives for the review of systems are mentioned in the HPI  All other organ systems are reviewed and are negative.    Constitutional and vital signs reviewed.      Social History and Family History elements reviewed from today, pertinent positives to the presenting problem noted.    Physical Exam     ED Triage Vitals [10/02/24 0941]   /76   Pulse 92   Resp 18   Temp 98.9 °F (37.2 °C)   Temp src Oral   SpO2 98 %   O2 Device None (Room air)       All measures to prevent infection transmission during my interaction with the patient were taken. The patient was already wearing a droplet mask on my arrival to the room. Personal protective equipment including droplet mask,  eye protection, and gloves were worn throughout the duration of the exam.  Handwashing was performed prior to and after the exam.  Stethoscope and any equipment used during my examination was cleaned with super sani-cloth germicidal wipes following the exam.     Physical Exam  Vitals and nursing note reviewed.   Constitutional:       Appearance: She is well-developed.   HENT:      Head: Normocephalic and atraumatic.      Right Ear: External ear normal.      Left Ear: External ear normal.      Nose: Nose normal.   Eyes:      General: No visual field deficit.     Conjunctiva/sclera: Conjunctivae normal.      Pupils: Pupils are equal, round, and reactive to light.   Cardiovascular:      Rate and Rhythm: Normal rate and regular rhythm.      Heart sounds: Normal heart sounds.   Pulmonary:      Effort: Pulmonary effort is normal.      Breath sounds: Normal breath sounds.   Abdominal:      General: Bowel sounds are normal.      Palpations: Abdomen is soft.   Musculoskeletal:         General: Normal range of motion.      Cervical back: Normal range of motion and neck supple.   Skin:     General: Skin is warm and dry.   Neurological:      Mental Status: She is alert and oriented to person, place, and time. Mental status is at baseline.      Cranial Nerves: No cranial nerve deficit, dysarthria or facial asymmetry.      Sensory: No sensory deficit.      Deep Tendon Reflexes: Reflexes are normal and symmetric.   Psychiatric:         Behavior: Behavior normal.         Thought Content: Thought content normal.         Judgment: Judgment normal.         ED Course        Labs Reviewed   COMP METABOLIC PANEL (14) - Abnormal; Notable for the following components:       Result Value    Glucose 121 (*)     Chloride 114 (*)     Calculated Osmolality 296 (*)     All other components within normal limits   MAGNESIUM - Normal   CBC WITH DIFFERENTIAL WITH PLATELET   RAINBOW DRAW LAVENDER   RAINBOW DRAW LIGHT GREEN   RAINBOW DRAW BLUE      EKG    Rate, intervals and axes as noted on EKG Report.  Rate: 97  Rhythm: Sinus Rhythm  Reading: No ST deviation, normal axis.             Imaging Results Available and Reviewed while in ED: No results found.  ED Medications Administered:   Medications   ondansetron (Zofran) 4 MG/2ML injection 4 mg (4 mg Intravenous Given 10/2/24 1106)   meclizine (Antivert) tab 25 mg (25 mg Oral Given 10/2/24 1106)   ketorolac (Toradol) 15 MG/ML injection 15 mg (15 mg Intravenous Given 10/2/24 1234)         MDM     Vitals:    10/02/24 0941 10/02/24 1045 10/02/24 1100 10/02/24 1115   BP: 118/76 101/59 114/62 118/65   Pulse: 92 82 83 84   Resp: 18 23 21 23   Temp: 98.9 °F (37.2 °C)      TempSrc: Oral      SpO2: 98% 96% 95% 99%   Weight: 102.1 kg      Height: 162.6 cm (5' 4\")        *I personally reviewed and interpreted all ED vitals.  I also personally reviewed all labs and imaging if ordered    Pulse Ox: 98%, Room air, Normal     Monitor Interpretation:   normal sinus rhythm    Differential Diagnosis/ Diagnostic Considerations: Vertigo, benign positional vertigo, labyrinthitis,    Medical Record Review: I personally reviewed available prior medical records for any recent pertinent discharge summaries, testing, and procedures and reviewed those reports.    Complicating Factors: The patient already has does not have any pertinent problems on file. to contribute to the complexity of this ED evaluation.    Medical Decision Making  49-year-old female presents ER with complaint of dizziness.  Patient with a past medical history of vertigo.  Patient with steady gait.  Patient complained of vertiginous symptoms only when she sits up.  Patient blood work within normal limits.  Patient complained of headache and given Toradol as well as Zofran and Antivert and states her vertigo has resolved.  Patient okay be discharged home instructed to follow-up with neurology if her symptoms continue    Problems Addressed:  Benign paroxysmal  positional vertigo, unspecified laterality: acute illness or injury  Nausea: acute illness or injury    Amount and/or Complexity of Data Reviewed  Labs: ordered. Decision-making details documented in ED Course.    Risk  Prescription drug management.        Condition upon leaving the department: Stable    Disposition and Plan     Clinical Impression:  1. Benign paroxysmal positional vertigo, unspecified laterality    2. Nausea        Disposition:  Discharge    Follow-up:  Kathleen Calzada DO  [address] [facility] [address]  [facility] [address]  [phone]    Schedule an appointment as soon as possible for a visit  If symptoms worsen      Medications Prescribed:  Current Discharge Medication List        START taking these medications    Details   montelukast 10 MG Oral Tab Take 1 tablet (10 mg total) by mouth daily.  Qty: 90 tablet, Refills: 3    Associated Diagnoses: Mild intermittent asthma, unspecified whether complicated (HCC)      Meloxicam 15 MG Oral Tab Take 1 tablet (15 mg total) by mouth daily.  Qty: 30 tablet, Refills: 1

## 2024-10-02 NOTE — ED INITIAL ASSESSMENT (HPI)
Pt c/o dizziness x 2 days. When she stands  up from sitting the room spins. Denies h/o vertigo. + ringing in right ear.

## 2024-10-02 NOTE — TELEPHONE ENCOUNTER
Refill passed per [facility] protocols.    Requested Prescriptions   Pending Prescriptions Disp Refills    montelukast 10 MG Oral Tab 90 tablet 3     Sig: Take 1 tablet (10 mg total) by mouth daily.       Asthma & COPD Medication Protocol Passed - 10/1/2024  9:05 AM        Passed - Asthma Action Score greater than or equal to 20        Passed - Appointment in past 6 or next 3 months      Recent Outpatient Visits              1 month ago [facility] Depo-Provera for [facility] [place]    Office Visit    2 months ago Complaints of total body pain    [facility] [place]    Office Visit    3 months ago Seropositive rheumatoid arthritis ([facility])    [facility] [place]    Office Visit    5 months ago Seropositive rheumatoid arthritis ([facility])    [facility] [place]    Telemedicine    5 months ago Rheumatoid arthritis involving multiple sites with positive rheumatoid factor [facility]          Future Appointments         Provider Department Appt Notes    In 2 days Jenni Ramsay APRN [facility] No medicine    In 3 days PFS DEXA RM 1; PFS XR RM1; PFS JAYA RM1 [facility] on 09/11/24 @ 5046 for patient to call back. We need her insurance information because it came back as rejected. TB    In 1 week Wilder Ward DO [facility] [place] [facility]                    Passed - AAP/ACT given in last 12 months     Yes  Yes  Yes  20            Meloxicam 15 MG Oral Tab 30 tablet 1     Sig: Take 1 tablet (15 mg total) by mouth daily.       Non-Narcotic Pain Medication Protocol Passed - 10/1/2024   9:05 AM        Passed - In person appointment or virtual visit in the past 6 mos or appointment in next 3 mos     Recent Outpatient Visits              1 month ago On Depo-Provera for contraception    [facility], [address], Jenni Floyd APRN    Office Visit    2 months ago Complaints of total body pain    [facility] [place]    Office Visit    3 months ago Seropositive rheumatoid arthritis ([facility])    [facility], Wilder Ivy DO    Office Visit    5 months ago Seropositive rheumatoid arthritis ([facility])    [facility] [place]    Telemedicine    5 months ago Rheumatoid arthritis involving multiple sites with positive rheumatoid factor ([facility])    [facility], Jenni Floyd APRN    Office Visit          Future Appointments         Provider Department Appt Notes    In 2 days [facility]    In 3 days PFS DEXA RM 1; PFS XR RM1; PFS JAYA RM1 [facility] on 09/11/24 @ 0295 for patient to call back. We need her insurance information because it came back as rejected. TB    In 1 week [facility]

## 2024-10-03 RX ORDER — PAROXETINE 20 MG/1
20 TABLET, FILM COATED ORAL EVERY MORNING
Qty: 90 TABLET | Refills: 1 | OUTPATIENT
Start: 2024-10-03

## 2024-10-04 ENCOUNTER — OFFICE VISIT (OUTPATIENT)
Dept: FAMILY MEDICINE CLINIC | Facility: CLINIC | Age: 49
End: 2024-10-04
Payer: MEDICAID

## 2024-10-04 ENCOUNTER — LAB ENCOUNTER (OUTPATIENT)
Dept: LAB | Age: 49
End: 2024-10-04
Attending: INTERNAL MEDICINE
Payer: MEDICAID

## 2024-10-04 VITALS
HEART RATE: 111 BPM | DIASTOLIC BLOOD PRESSURE: 76 MMHG | BODY MASS INDEX: 39.27 KG/M2 | RESPIRATION RATE: 16 BRPM | TEMPERATURE: 98 F | SYSTOLIC BLOOD PRESSURE: 119 MMHG | HEIGHT: 64 IN | OXYGEN SATURATION: 97 % | WEIGHT: 230 LBS

## 2024-10-04 DIAGNOSIS — R42 VERTIGO: Primary | ICD-10-CM

## 2024-10-04 DIAGNOSIS — Z51.81 MEDICATION MONITORING ENCOUNTER: ICD-10-CM

## 2024-10-04 DIAGNOSIS — F41.9 ANXIETY: ICD-10-CM

## 2024-10-04 DIAGNOSIS — M05.79 RHEUMATOID ARTHRITIS INVOLVING MULTIPLE SITES WITH POSITIVE RHEUMATOID FACTOR (HCC): ICD-10-CM

## 2024-10-04 LAB
ALBUMIN SERPL-MCNC: 4.2 G/DL (ref 3.2–4.8)
ALBUMIN/GLOB SERPL: 1.2 {RATIO} (ref 1–2)
ALP LIVER SERPL-CCNC: 69 U/L
ALT SERPL-CCNC: 15 U/L
ANION GAP SERPL CALC-SCNC: 9 MMOL/L (ref 0–18)
AST SERPL-CCNC: 15 U/L (ref ?–34)
BILIRUB SERPL-MCNC: 0.3 MG/DL (ref 0.3–1.2)
BUN BLD-MCNC: 9 MG/DL (ref 9–23)
BUN/CREAT SERPL: 9.6 (ref 10–20)
CALCIUM BLD-MCNC: 9.7 MG/DL (ref 8.7–10.4)
CHLORIDE SERPL-SCNC: 111 MMOL/L (ref 98–112)
CO2 SERPL-SCNC: 23 MMOL/L (ref 21–32)
CREAT BLD-MCNC: 0.94 MG/DL
EGFRCR SERPLBLD CKD-EPI 2021: 74 ML/MIN/1.73M2 (ref 60–?)
EST. AVERAGE GLUCOSE BLD GHB EST-MCNC: 123 MG/DL (ref 68–126)
FASTING STATUS PATIENT QL REPORTED: NO
GLOBULIN PLAS-MCNC: 3.4 G/DL (ref 2–3.5)
GLUCOSE BLD-MCNC: 157 MG/DL (ref 70–99)
HBA1C MFR BLD: 5.9 % (ref ?–5.7)
OSMOLALITY SERPL CALC.SUM OF ELEC: 298 MOSM/KG (ref 275–295)
POTASSIUM SERPL-SCNC: 3.4 MMOL/L (ref 3.5–5.1)
PROT SERPL-MCNC: 7.6 G/DL (ref 5.7–8.2)
SODIUM SERPL-SCNC: 143 MMOL/L (ref 136–145)

## 2024-10-04 PROCEDURE — 83036 HEMOGLOBIN GLYCOSYLATED A1C: CPT

## 2024-10-04 PROCEDURE — 99213 OFFICE O/P EST LOW 20 MIN: CPT

## 2024-10-04 PROCEDURE — 80053 COMPREHEN METABOLIC PANEL: CPT

## 2024-10-04 PROCEDURE — 36415 COLL VENOUS BLD VENIPUNCTURE: CPT

## 2024-10-04 RX ORDER — MECLIZINE HYDROCHLORIDE 25 MG/1
25 TABLET ORAL 3 TIMES DAILY PRN
Qty: 15 TABLET | Refills: 0 | Status: SHIPPED | OUTPATIENT
Start: 2024-10-04

## 2024-10-04 RX ORDER — HYDROXYZINE HYDROCHLORIDE 50 MG/1
25 TABLET, FILM COATED ORAL NIGHTLY PRN
Qty: 15 TABLET | Refills: 0 | Status: SHIPPED | OUTPATIENT
Start: 2024-10-04

## 2024-10-04 RX ORDER — PAROXETINE 20 MG/1
20 TABLET, FILM COATED ORAL EVERY MORNING
Qty: 90 TABLET | Refills: 3 | Status: SHIPPED | OUTPATIENT
Start: 2024-10-04

## 2024-10-04 NOTE — PROGRESS NOTES
HPI:    Patient ID: Arielle Kidd is a 49 year old female.    HPI     Patient here in office for ER follow-up.  Was seen on 10/2/2024 for dizziness.  CBC, CMP, magnesium, and EKG completed and showed no acute findings.  Patient given meclizine 25 mg and 1 dose in ER and advised to follow-up with PCP.  Per patient, dizziness persisted.  Denies nasal congestion, sore throat, sinus pain/pressure, or ear pain.      Interested in restarting paroxetine 20 mg daily for anxiety.  BASHIR-7 screening completed and significant for mild anxiety.  Also reports difficulty with sleeping.      States she is having difficulty with getting script for methotrexate from pharmacy. Scheduled for follow-up with rheumatology on 10/11/24.     Review of Systems   Constitutional: Negative.    HENT: Negative.  Negative for ear pain, postnasal drip and sore throat.    Respiratory: Negative.     Cardiovascular: Negative.    Musculoskeletal:  Positive for arthralgias.   Skin: Negative.    Neurological:  Positive for dizziness.   Psychiatric/Behavioral: Negative.              Current Outpatient Medications   Medication Sig Dispense Refill    meclizine 25 MG Oral Tab Take 1 tablet (25 mg total) by mouth 3 (three) times daily as needed. 15 tablet 0    PARoxetine 20 MG Oral Tab Take 1 tablet (20 mg total) by mouth every morning. 90 tablet 3    hydrOXYzine 50 MG Oral Tab Take 0.5 tablets (25 mg total) by mouth nightly as needed for Anxiety (Can increase to 50 mg nightly if symptoms worsening/not improving in 2-3 days). 15 tablet 0    montelukast 10 MG Oral Tab Take 1 tablet (10 mg total) by mouth daily. 90 tablet 3    Meloxicam 15 MG Oral Tab Take 1 tablet (15 mg total) by mouth daily. 30 tablet 1    methotrexate 2.5 MG Oral Tab Take 7 tablets of Methotrexate 2.5 mg once weekly with food. 28 tablet 0    folic acid 1 MG Oral Tab Take 1 tablet (1 mg total) by mouth daily. 90 tablet 2    predniSONE 5 MG Oral Tab Take 1 tablet (5 mg total) by mouth daily  as needed. Take with breakfast as needed in the morning for breakthrough pain.      fluticasone propionate 50 MCG/ACT Nasal Suspension 2 sprays by Each Nare route daily. 1 each 0    cetirizine 10 MG Oral Tab Take 1 tablet (10 mg total) by mouth daily. 30 tablet 0    SYMBICORT 160-4.5 MCG/ACT Inhalation Aerosol Inhale 2 puffs into the lungs 2 (two) times daily. 10.2 g 2    albuterol 108 (90 Base) MCG/ACT Inhalation Aero Soln Inhale 2 puffs into the lungs every 4 (four) hours as needed for Wheezing. 18 g 2    Spacer/Aero-Holding Chambers Does not apply Device Use with inhaler as needed 1 each 0     Allergies:No Known Allergies   /76   Pulse 111   Temp 98 °F (36.7 °C) (Temporal)   Resp 16   Ht 5' 4\" (1.626 m)   Wt 230 lb (104.3 kg)   SpO2 97%   BMI 39.48 kg/m²   Body mass index is 39.48 kg/m².  PHYSICAL EXAM:   Physical Exam  Vitals reviewed.   Constitutional:       Appearance: Normal appearance.   HENT:      Right Ear: Tympanic membrane, ear canal and external ear normal. There is no impacted cerumen.      Left Ear: Tympanic membrane, ear canal and external ear normal. There is no impacted cerumen.      Nose: Nose normal. No congestion.      Mouth/Throat:      Mouth: Mucous membranes are moist.      Pharynx: No oropharyngeal exudate or posterior oropharyngeal erythema.   Eyes:      General:         Right eye: No discharge.         Left eye: No discharge.      Conjunctiva/sclera: Conjunctivae normal.   Cardiovascular:      Rate and Rhythm: Normal rate and regular rhythm.      Heart sounds: Normal heart sounds. No murmur heard.     No friction rub. No gallop.   Pulmonary:      Effort: Pulmonary effort is normal. No respiratory distress.      Breath sounds: No stridor. No wheezing or rhonchi.   Chest:      Chest wall: No tenderness.   Musculoskeletal:      Cervical back: Neck supple.   Skin:     General: Skin is warm.      Findings: No rash.   Neurological:      General: No focal deficit present.      Mental  Status: She is alert and oriented to person, place, and time.   Psychiatric:         Mood and Affect: Mood normal.         Behavior: Behavior normal.         Thought Content: Thought content normal.         Judgment: Judgment normal.                ASSESSMENT/PLAN:   1. Vertigo  - meclizine 25 MG Oral Tab,  Take 1 tablet (25 mg total) by mouth 3 (three) times daily as needed.   -Advised patient on Epley maneuver, handout given  - Return to office if symptoms worsening/not improving    2. Anxiety  -Restart paroxetine 20 mg daily  - Take hydroxyzine 25 mg nightly as needed for sleep.  Increase to 50 mg nightly in 2-3 days if symptoms worsening/not improving    3. Rheumatoid arthritis involving multiple sites with positive rheumatoid factor (HCC)  -Advised patient that prescription for methotrexate sent to pharmacy on 10/1/2024 by rheumatology.  Encouraged patient to contact pharmacy regarding medication refill      No orders of the defined types were placed in this encounter.      Meds This Visit:  Requested Prescriptions     Signed Prescriptions Disp Refills    meclizine 25 MG Oral Tab 15 tablet 0     Sig: Take 1 tablet (25 mg total) by mouth 3 (three) times daily as needed.    PARoxetine 20 MG Oral Tab 90 tablet 3     Sig: Take 1 tablet (20 mg total) by mouth every morning.    hydrOXYzine 50 MG Oral Tab 15 tablet 0     Sig: Take 0.5 tablets (25 mg total) by mouth nightly as needed for Anxiety (Can increase to 50 mg nightly if symptoms worsening/not improving in 2-3 days).       Imaging & Referrals:  None         ID#[id number]

## 2024-10-11 ENCOUNTER — OFFICE VISIT (OUTPATIENT)
Dept: RHEUMATOLOGY | Facility: CLINIC | Age: 49
End: 2024-10-11

## 2024-10-11 VITALS
HEART RATE: 94 BPM | SYSTOLIC BLOOD PRESSURE: 130 MMHG | DIASTOLIC BLOOD PRESSURE: 73 MMHG | WEIGHT: 230 LBS | BODY MASS INDEX: 39.27 KG/M2 | HEIGHT: 64 IN

## 2024-10-11 DIAGNOSIS — M05.9 SEROPOSITIVE RHEUMATOID ARTHRITIS (HCC): Primary | ICD-10-CM

## 2024-10-11 DIAGNOSIS — M06.4 INFLAMMATORY POLYARTHRITIS (HCC): ICD-10-CM

## 2024-10-11 PROCEDURE — 99214 OFFICE O/P EST MOD 30 MIN: CPT | Performed by: INTERNAL MEDICINE

## 2024-10-11 NOTE — PROGRESS NOTES
RHEUMATOLOGY CLINIC- FOLLOW UP    Arielle Kidd is a 49 year old female.    ASSESSMENT/PLAN:       ICD-10-CM    1. Seropositive rheumatoid arthritis (HCC): +GRISELDA/RF/CCP  M05.9       2. Inflammatory polyarthritis (HCC)  M06.4         DISCUSSION:  Patient presents for follow-up for seropositive RA (positive GRISELDA, CCP, RF, with periarticular osteopenia) doing overall better on methotrexate.  However, had lost insurance in September and was off methotrexate for about 3 weeks, resuming 10/1 with noted flare of symptoms.  Therefore, has continued on prednisone 10 mg daily as well.  Discussed with patient to continue current dosing of methotrexate while attempting to wean prednisone further: From prednisone 10 daily to 5 daily and, if possible, only on a prn basis.       PLAN:  - Prednisone 5 mg qAM for now. Discussed with patient to try to dec to PRN Predniseon 5 mg q AM.   -Continue Methotrexate as follows: methotrexate 2.5 mg tablets x 7 tabs qw with daily folic acid 1 mg. Side effects of MTX prev discussed including possible alopecia, hepatotoxicity, mucositis, GI upset, as well as immunosuppressive effects.  Also, emphasized importance of avoiding EtOH while on MTX.  Explained that MTX may take several weeks to months for noticeable symptom improvement.  - Discussed with patient, given possible immunosuppressive effects of MTX, the importance of keeping vaccinations up-to-date.    -Trend labs every 3 months.   - Consult/evaluation communicated with referring physician/provider.    Patient to follow-up in about 2m. Next labs due 1/2025  Wilder Ward DO  10/11/2024   3:10 PM    There is a longitudinal care relationship with me, the care plan reflects the ongoing nature of the continuous relationship of care, and the medical record indicates that there is ongoing treatment of a serious/complex medical condition which I am currently managing.  is Applicable.     Discussed with patient that they are on chronic  treatment with a high-risk medication that requires close lab monitoring for possible drug toxicity.  Additionally, discussed with patient give the possible immunosuppressive effects of their medication as well as their underlying hyper-inflammatory state, the importance of keeping vaccinations and age-appropriate screenings up-to-date, given increased risk of complications and malignancies seen in these patient populations.  Patient to f/u with repeat labs drawn prior (standing labs ordered).  Last QuantiFERON TB testing: 3/15/2024  Last Hepatitis testing: 3/15/2024        SUBJECTIVE:     10/11/24 Follow-Up: Patient lost insurance last month so was off methotrexate for about 3 weeks.  Did experience a flare of her arthralgias with joint swelling around this time as well.  Resumed 10/1 with improvement.  However, does continue on prednisone daily.    Current Medications:  Methotrexate 2.5 mg tablets x 5 tablets once weekly with folic acid 1 mg daily-started at 5 tablets once weekly 4/10/2024. Inc to 7 qw 6/2024, however, off for about 3w in Sept 2024 given insurance    P.o. prednisone 10 mg daily-prednisone responsive, most response to higher doses.    Medication History:  Prednisone-responsive, patient previously treated for asthma flares with improvement of her arthralgias    PRN Ibuprofen, Naproxen, Tylenol- Ineffective    Interval History:     3/14/24 Initial Consult: I had the pleasure of seeing Arielle Kidd on 3/15/2024 as a new outpatient consultation for positive RF serologies. The patient was originally referred by LISS Ramsay.     48 year old female w/ PMH asthma, goiter who presents to clinic today. Patient originally evaluated at her primary care office for arthralgias/myalgias worsening since December 2023.  She describes back, knee, hand pain with associated swelling described as an aching pain that is worse even while resting.  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  She has AM stiffness for about 1 hour.  Also reports generalized fatigue.  Symptoms are worse at night and patient attempting to treat with as needed ibuprofen, naproxen, Tylenol.  ROS negative for unexplained fever, chills, unintentional weight loss, Raynaud's phenomenon, sicca symptoms (however, patient notes recent complicated dental history given weak and broken teeth), hematuria.  She notes an itchy rash alongside her chest that she first noted during the summertime that comes and goes.  She has a strong family history of autoimmune diseases including a mother with SLE/RA, maternal uncle with SLE, maternal aunts with SLE, and a maternal grandmother with RA.  Interestingly, patient was treated for an asthma exacerbation October 2023 with prednisone 80 mg daily with significant improvement of her symptoms.    4/10/24 virtual Follow-Up: Patient reporting significant improvement of symptoms since the p.o. prednisone taper, especially so at the higher doses.  Lowest effective dose appears to be around 10 mg daily with recurrence of symptoms around 5 mg daily.  She presents virtually today to discuss recent lab work and imaging.    6/20/24 Follow-Up: Patient recently had dental surgery related to dentures and is on antibiotics so held her last dose of methotrexate.  Otherwise, notes overall improvement in her arthralgias since starting methotrexate.  She did hold her prednisone dose for a couple days given her mouth soreness but noted the arthralgias did not flareup significantly.    HISTORY:  Past Medical History:    Anxiety    Asthma (HCC)      Social Hx Reviewed   Family Hx Reviewed     Medications (Active prior to today's visit):  Current Outpatient Medications   Medication Sig Dispense Refill    meclizine 25 MG Oral Tab Take 1 tablet (25 mg total) by mouth 3 (three) times daily as needed. 15 tablet 0    PARoxetine 20 MG Oral Tab Take 1  tablet (20 mg total) by mouth every morning. 90 tablet 3    hydrOXYzine 50 MG Oral Tab Take 0.5 tablets (25 mg total) by mouth nightly as needed for Anxiety (Can increase to 50 mg nightly if symptoms worsening/not improving in 2-3 days). 15 tablet 0    montelukast 10 MG Oral Tab Take 1 tablet (10 mg total) by mouth daily. 90 tablet 3    Meloxicam 15 MG Oral Tab Take 1 tablet (15 mg total) by mouth daily. 30 tablet 1    methotrexate 2.5 MG Oral Tab Take 7 tablets of Methotrexate 2.5 mg once weekly with food. 28 tablet 0    folic acid 1 MG Oral Tab Take 1 tablet (1 mg total) by mouth daily. 90 tablet 2    predniSONE 5 MG Oral Tab Take 1 tablet (5 mg total) by mouth daily as needed. Take with breakfast as needed in the morning for breakthrough pain.      fluticasone propionate 50 MCG/ACT Nasal Suspension 2 sprays by Each Nare route daily. 1 each 0    cetirizine 10 MG Oral Tab Take 1 tablet (10 mg total) by mouth daily. 30 tablet 0    SYMBICORT 160-4.5 MCG/ACT Inhalation Aerosol Inhale 2 puffs into the lungs 2 (two) times daily. 10.2 g 2    albuterol 108 (90 Base) MCG/ACT Inhalation Aero Soln Inhale 2 puffs into the lungs every 4 (four) hours as needed for Wheezing. 18 g 2    Spacer/Aero-Holding Chambers Does not apply Device Use with inhaler as needed 1 each 0       Allergies:  No Known Allergies      ROS:   Review of Systems   Constitutional:  Negative for chills, fatigue and fever.   HENT:  Negative for congestion, hearing loss, mouth sores, nosebleeds and trouble swallowing.    Eyes:  Negative for photophobia, pain, redness and visual disturbance.   Respiratory:  Negative for cough and shortness of breath.    Cardiovascular:  Negative for chest pain, palpitations and leg swelling.   Gastrointestinal:  Negative for abdominal pain, blood in stool, diarrhea and nausea.   Endocrine: Negative for cold intolerance and heat intolerance.   Genitourinary:  Negative for dysuria, frequency and hematuria.   Musculoskeletal:   Positive for arthralgias. Negative for back pain, gait problem, joint swelling, myalgias, neck pain and neck stiffness.   Skin:  Negative for color change and rash.   Neurological:  Negative for dizziness, weakness, numbness and headaches.   Psychiatric/Behavioral:  Negative for confusion and dysphoric mood.         PHYSICAL EXAM:     Constitutional:  Well developed, Well nourished, No acute distress  HENT:  Normocephalic, Atraumatic, Bilateral external ears normal, Oropharynx moist, No oral exudates.  Neck: Normal range of motion, No tenderness, Supple, No stridor.  Eyes:  PERRL, EOMI, Conjunctiva normal, No discharge.  Respiratory:  Normal breath sounds, No respiratory distress, No wheezing.  Cardiovascular:  Normal heart rate, Normal rhythm, No murmurs, No rubs, No gallops.  GI:  Bowel sounds normal, Soft, No tenderness, No masses, No pulsatile masses.  : No CVA tenderness.  Musculoskeletal:  Good range of motion in all major joints. A comprehensive 28 count joint exam was done and was negative for swelling or tenderness except as noted. Inspections for misalignment, asymmetry, crepitation, defects, tenderness, masses, nodules, effusions, range of motion, and stability in the upper and lower extremities bilaterally are all normal unless noted.           Integument:  Warm, Dry, No erythema, No rash.  Lymphatic:  No lymphadenopathy noted.  Neurologic:  Alert & oriented x 3, Normal motor function, Normal sensory function, No focal deficits noted.  Psychiatric:  Affect normal, Judgment normal, Mood normal.      LABS:     Prior lab work reviewed and notable for:    10/5/2024:  CMP with BUN 9, CR 0.94, LFTs are elevated, no gamma gap noted    10/2/2024:  CMP with BUN 10, CR 0.9, LFTs nonelevated  CBC without cytopenias or leukocytosis    6//20/24:  CBC without cytopenias or leukocytosis  CRP less than 0.4 WNL, ESR 53 (high)  ALT 16 WNL, AST 15 WNL, CR 0.93 WNL  3/15/2024:  GRISELDA 1: 320 speckled with reflex antibodies  negative  dsDNA by IFA less than 10 WNL, dsDNA 2.3 WNL  C4 26.3 WNL, C3 158.6 WNL  CRP less than 0.4 WNL, ESR 24 (high)  CCP greater than 340 (high)    Acute hepatitis panel and TB testing negative    CBC without cytopenias nor leukocytosis  ALT 13 WNL, AST 15 WNL, CR 0.95 WNL  UA with negative blood/protein, 500 leuk esterase but only 6/10 WBC  Monoclonal protein study without evidence of monoclonal protein      2024:  CTD screen by [facility] equivocal    10/16/2023:  TSH 1.030  CMP with normal renal and hepatic function, gamma gap noted  CBC without cytopenias nor leukocytosis  RF 81 (high)  CRP less than 0.4  Imagin/20/24 CXR:     Impression   CONCLUSION:  1. No acute cardiopulmonary disease       3/15/2024 knee x-rays:  L Knee Impression      No acute fracture or dislocation.  No joint effusion.     Mild periarticular osteopenia, nonspecific, can be seen in inflammatory arthropathy.  No joint space narrowing or osteophyte formation.     Soft tissues are unremarkable.          R Knee Impression      No acute fracture or dislocation.  No joint effusion.     Mild peritoneal osteopenia, nonspecific, can be seen with inflammatory arthropathy.  No significant joint space narrowing or osteophyte formation.     Soft tissues are unremarkable.

## 2024-11-12 NOTE — TELEPHONE ENCOUNTER
[facility].  Ok to continue rx at this time?  LR- 10/02/24 x 2 month supply.  LOV 10/04/24  Methotrexate on active med list.    Requested Prescriptions   Pending Prescriptions Disp Refills    MELOXICAM 15 MG Oral Tab [Pharmacy Med Name: MELOXICAM 15MG TABLETS] 30 tablet 1     Sig: TAKE 1 TABLET(15 MG) BY MOUTH DAILY       Non-Narcotic Pain Medication Protocol Passed - 11/11/2024  6:30 PM        Passed - In person appointment or virtual visit in the past 6 mos or appointment in next 3 mos     Recent Outpatient Visits              1 month ago Seropositive rheumatoid arthritis (HCC): +GRISELDA/RF/CCP    [facility] Wilder Ward DO    Office Visit    1 month ago [facility]igo    [facility] [place]    Office Visit    2 months ago On Depo-Provera for contraception    [facility] [place]    Office Visit    4 months ago Complaints of total body pain    [facility] Koko Gibson APRN    Office Visit    4 months ago Seropositive rheumatoid arthritis (HCC)    [facility] Wilder Ward DO    Office Visit          Future Appointments         Provider Department Appt Notes    In 1 month Wilder Ward DO [facility]

## 2024-11-13 RX ORDER — MELOXICAM 15 MG/1
15 TABLET ORAL DAILY
Qty: 30 TABLET | Refills: 1 | Status: SHIPPED | OUTPATIENT
Start: 2024-11-13

## 2024-12-16 RX ORDER — METHOTREXATE 2.5 MG/1
TABLET ORAL
Qty: 28 TABLET | Refills: 0 | Status: SHIPPED | OUTPATIENT
Start: 2024-12-16

## 2024-12-16 NOTE — TELEPHONE ENCOUNTER
LOV: 10/11/24  Future Appointments   Date Time Provider Department Center   12/19/2024  3:00 PM Wilder Ward DO [id number]   12/21/2024 11:40 AM Jenni Ramsay APRN [facility]     Labs: AST 15 ALT 15 10/4/24  Expand All Collapse All    RHEUMATOLOGY CLINIC- FOLLOW UP     Arielle Kidd is a 49 year old female.     ASSESSMENT/PLAN:          ICD-10-CM     1. Seropositive rheumatoid arthritis (HCC): +GRISELDA/RF/CCP  M05.9         2. Inflammatory polyarthritis (HCC)  M06.4            DISCUSSION:  Patient presents for follow-up for seropositive RA (positive GRISELDA, CCP, RF, with periarticular osteopenia) doing overall better on methotrexate.  However, had lost insurance in September and was off methotrexate for about 3 weeks, resuming 10/1 with noted flare of symptoms.  Therefore, has continued on prednisone 10 mg daily as well.  Discussed with patient to continue current dosing of methotrexate while attempting to wean prednisone further: From prednisone 10 daily to 5 daily and, if possible, only on a prn basis.         PLAN:  - Prednisone 5 mg qAM for now. Discussed with patient to try to dec to PRN Predniseon 5 mg q AM.   -Continue Methotrexate as follows: methotrexate 2.5 mg tablets x 7 tabs qw with daily folic acid 1 mg. Side effects of MTX prev discussed including possible alopecia, hepatotoxicity, mucositis, GI upset, as well as immunosuppressive effects.  Also, emphasized importance of avoiding EtOH while on MTX.  Explained that MTX may take several weeks to months for noticeable symptom improvement.  - Discussed with patient, given possible immunosuppressive effects of MTX, the importance of keeping vaccinations up-to-date.    -Trend labs every 3 months.   - Consult/evaluation communicated with referring physician/provider.     Patient to follow-up in about 2m. Next labs due 1/2025  Wilder Ward DO  10/11/2024   3:10 PM

## 2024-12-19 ENCOUNTER — TELEMEDICINE (OUTPATIENT)
Dept: RHEUMATOLOGY | Facility: CLINIC | Age: 49
End: 2024-12-19
Payer: MEDICAID

## 2024-12-19 DIAGNOSIS — Z83.2 FAMILY HISTORY OF AUTOIMMUNE DISORDER: ICD-10-CM

## 2024-12-19 DIAGNOSIS — Z79.899 ENCOUNTER FOR LONG-TERM (CURRENT) USE OF HIGH-RISK MEDICATION: ICD-10-CM

## 2024-12-19 DIAGNOSIS — M05.9 SEROPOSITIVE RHEUMATOID ARTHRITIS (HCC): Primary | ICD-10-CM

## 2024-12-19 DIAGNOSIS — M06.4 INFLAMMATORY POLYARTHRITIS (HCC): ICD-10-CM

## 2024-12-19 DIAGNOSIS — R79.9 ABNORMAL BLOOD FINDING: ICD-10-CM

## 2024-12-19 PROCEDURE — 99215 OFFICE O/P EST HI 40 MIN: CPT | Performed by: INTERNAL MEDICINE

## 2024-12-19 RX ORDER — METHOTREXATE 2.5 MG/1
TABLET ORAL
Qty: 40 TABLET | Refills: 2 | Status: SHIPPED | OUTPATIENT
Start: 2024-12-19

## 2024-12-19 RX ORDER — PREDNISONE 5 MG/1
5 TABLET ORAL DAILY PRN
Qty: 90 TABLET | Refills: 2 | Status: SHIPPED | OUTPATIENT
Start: 2024-12-19

## 2024-12-19 NOTE — PROGRESS NOTES
This visit is conducted using Telemedicine with live, interactive video and audio.    Patient has been referred to the [facility] website at [email or web address] to review the yearly Consent to Treat document.    Patient understands and accepts financial responsibility for any deductible, co-insurance and/or co-pays associated with this service.      RHEUMATOLOGY CLINIC- FOLLOW UP    Arielle Kidd is a 49 year old female.    ASSESSMENT/PLAN:       ICD-10-CM    1. Seropositive rheumatoid arthritis (HCC): +GRISELDA/RF/CCP  M05.9 ALT(SGPT)     AST (SGOT)     CBC With Differential With Platelet     Creatinine, Serum     Sed Rate, Westergren (Automated)     C-Reactive Protein      2. Inflammatory polyarthritis (HCC)  M06.4 predniSONE 5 MG Oral Tab     ALT(SGPT)     AST (SGOT)     CBC With Differential With Platelet     Creatinine, Serum     Sed Rate, Westergren (Automated)     C-Reactive Protein      3. Gamma Gap  R79.9 predniSONE 5 MG Oral Tab     ALT(SGPT)     AST (SGOT)     CBC With Differential With Platelet     Creatinine, Serum     Sed Rate, Westergren (Automated)     C-Reactive Protein      4. Family history of autoimmune disorder  Z83.2 predniSONE 5 MG Oral Tab     ALT(SGPT)     AST (SGOT)     CBC With Differential With Platelet     Creatinine, Serum     Sed Rate, Westergren (Automated)     C-Reactive Protein      5. Encounter for long-term (current) use of high-risk medication  Z79.899 ALT(SGPT)     AST (SGOT)     CBC With Differential With Platelet     Creatinine, Serum     Sed Rate, Westergren (Automated)     C-Reactive Protein          DISCUSSION:  Patient presents for follow-up for seropositive RA (positive GRISELDA, CCP, RF, with periarticular osteopenia) doing overall better on methotrexate, however, has been off methotrexate for about 2 weeks given refill issues.  Discussed with patient increase in methotrexate, as below.  Also discussed would be okay for as needed prednisone use given her symptoms.    PLAN:  -   PRN Predniseon 5 mg q AM.   -Inc Methotrexate as follows: methotrexate 2.5 mg tablets x 10  tabs qw with daily folic acid 1 mg. Side effects of MTX prev discussed including possible alopecia, hepatotoxicity, mucositis, GI upset, as well as immunosuppressive effects.  Also, emphasized importance of avoiding EtOH while on MTX.  Explained that MTX may take several weeks to months for noticeable symptom improvement.  - Discussed with patient, given possible immunosuppressive effects of MTX, the importance of keeping vaccinations up-to-date.      - Consult/evaluation communicated with referring physician/provider.    Patient to follow-up in ~2m with repeat labs drawn prior.     Wilder Ward,   12/19/2024   3:54 PM    There is a longitudinal care relationship with me, the care plan reflects the ongoing nature of the continuous relationship of care, and the medical record indicates that there is ongoing treatment of a serious/complex medical condition which I am currently managing.  is Applicable.     Discussed with patient that they are on chronic treatment with a high-risk medication that requires close lab monitoring for possible drug toxicity.  Additionally, discussed with patient give the possible immunosuppressive effects of their medication as well as their underlying hyper-inflammatory state, the importance of keeping vaccinations and age-appropriate screenings up-to-date, given increased risk of complications and malignancies seen in these patient populations.  Patient to f/u with repeat labs drawn prior (standing labs ordered).  Last QuantiFERON TB testing: 3/15/2024  Last Hepatitis testing: 3/15/2024        SUBJECTIVE:     12/19/24 Follow-Up: Patient has been trying to minimize her prednisone use with last dose about 1 month ago, however, notes about 4 days a week that prednisone would likely be helpful given worsening of her arthralgias.  Unfortunately, ran out of methotrexate about 2 weeks ago so that  was her last dose.  Continues to have arthralgias.    Current Medications:  Methotrexate 2.5 mg tablets x 7 tablets once weekly with folic acid 1 mg daily-started at 5 tablets once weekly 4/10/2024. Inc to 7 qw 6/2024, however, off for about 3w in Sept 2024 given insurance    PRN P.o. prednisone 5 mg daily-prednisone responsive, most response to higher doses.    Medication History:  Prednisone-responsive, patient previously treated for asthma flares with improvement of her arthralgias    PRN Ibuprofen, Naproxen, Tylenol- Ineffective    Interval History:     3/14/24 Initial Consult: I had the pleasure of seeing Arielle Kidd on 3/15/2024 as a new outpatient consultation for positive RF serologies. The patient was originally referred by LISS Ramsay.     48 year old female w/ PMH asthma, goiter who presents to clinic today. Patient originally evaluated at her primary care office for arthralgias/myalgias worsening since December 2023.  She describes back, knee, hand pain with associated swelling described as an aching pain that is worse even while resting.  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  She has AM stiffness for about 1 hour.  Also reports generalized fatigue.  Symptoms are worse at night and patient attempting to treat with as needed ibuprofen, naproxen, Tylenol.  ROS negative for unexplained fever, chills, unintentional weight loss, Raynaud's phenomenon, sicca symptoms (however, patient notes recent complicated dental history given weak and broken teeth), hematuria.  She notes an itchy rash alongside her chest that she first noted during the summertime that comes and goes.  She has a strong family history of autoimmune diseases including a mother with SLE/RA, maternal uncle with SLE, maternal aunts with SLE, and a maternal grandmother with RA.  Interestingly, patient was treated for an asthma  exacerbation October 2023 with prednisone 80 mg daily with significant improvement of her symptoms.    4/10/24 virtual Follow-Up: Patient reporting significant improvement of symptoms since the p.o. prednisone taper, especially so at the higher doses.  Lowest effective dose appears to be around 10 mg daily with recurrence of symptoms around 5 mg daily.  She presents virtually today to discuss recent lab work and imaging.    6/20/24 Follow-Up: Patient recently had dental surgery related to dentures and is on antibiotics so held her last dose of methotrexate.  Otherwise, notes overall improvement in her arthralgias since starting methotrexate.  She did hold her prednisone dose for a couple days given her mouth soreness but noted the arthralgias did not flareup significantly.      10/11/24 Follow-Up: Patient lost insurance last month so was off methotrexate for about 3 weeks.  Did experience a flare of her arthralgias with joint swelling around this time as well.  Resumed 10/1 with improvement.  However, does continue on prednisone daily.    HISTORY:  Past Medical History:    Anxiety    Asthma ([facility])      Social Hx Reviewed   Family Hx Reviewed     Medications (Active prior to today's visit):  Current Outpatient Medications   Medication Sig Dispense Refill    methotrexate 2.5 MG Oral Tab Take 7 tablets of Methotrexate 2.5 mg once weekly with food. 28 tablet 0    MELOXICAM 15 MG Oral Tab TAKE 1 TABLET(15 MG) BY MOUTH DAILY 30 tablet 1    meclizine 25 MG Oral Tab Take 1 tablet (25 mg total) by mouth 3 (three) times daily as needed. 15 tablet 0    PARoxetine 20 MG Oral Tab Take 1 tablet (20 mg total) by mouth every morning. 90 tablet 3    hydrOXYzine 50 MG Oral Tab Take 0.5 tablets (25 mg total) by mouth nightly as needed for Anxiety (Can increase to 50 mg nightly if symptoms worsening/not improving in 2-3 days). 15 tablet 0    montelukast 10 MG Oral Tab Take 1 tablet (10 mg total) by mouth daily. 90 tablet 3    folic acid  1 MG Oral Tab Take 1 tablet (1 mg total) by mouth daily. 90 tablet 2    predniSONE 5 MG Oral Tab Take 1 tablet (5 mg total) by mouth daily as needed. Take with breakfast as needed in the morning for breakthrough pain.      fluticasone propionate 50 MCG/ACT Nasal Suspension 2 sprays by Each Nare route daily. 1 each 0    cetirizine 10 MG Oral Tab Take 1 tablet (10 mg total) by mouth daily. 30 tablet 0    SYMBICORT 160-4.5 MCG/ACT Inhalation Aerosol Inhale 2 puffs into the lungs 2 (two) times daily. 10.2 g 2    albuterol 108 (90 Base) MCG/ACT Inhalation Aero Soln Inhale 2 puffs into the lungs every 4 (four) hours as needed for Wheezing. 18 g 2    Spacer/Aero-Holding Chambers Does not apply Device Use with inhaler as needed 1 each 0       Allergies:  No Known Allergies      ROS:   Review of Systems   Constitutional:  Negative for chills, fatigue and fever.   HENT:  Negative for congestion, hearing loss, mouth sores, nosebleeds and trouble swallowing.    Eyes:  Negative for photophobia, pain, redness and visual disturbance.   Respiratory:  Negative for cough and shortness of breath.    Cardiovascular:  Negative for chest pain, palpitations and leg swelling.   Gastrointestinal:  Negative for abdominal pain, blood in stool, diarrhea and nausea.   Endocrine: Negative for cold intolerance and heat intolerance.   Genitourinary:  Negative for dysuria, frequency and hematuria.   Musculoskeletal:  Positive for arthralgias and joint swelling. Negative for back pain, gait problem, myalgias, neck pain and neck stiffness.   Skin:  Negative for color change and rash.   Neurological:  Negative for dizziness, weakness, numbness and headaches.   Psychiatric/Behavioral:  Negative for confusion and dysphoric mood.         PHYSICAL EXAM:       Physical exam limited due to virtual nature of appointment    Constitutional:  Well developed, Well nourished, No acute distress  HENT:  Normocephalic, Atraumatic  Integument: No erythema, No  rash.  Lymphatic:  No lymphadenopathy noted.  Neurologic:  Alert & oriented x 3,  Psychiatric:  Affect normal, Judgment normal, Mood normal.    LABS:     Prior lab work reviewed and notable for:    10/4/2024:  CMP with BUN 9, CR 0.94, LFTs are elevated, no gamma gap noted    10/2/2024:  CMP with BUN 10, CR 0.9, LFTs nonelevated  CBC without cytopenias or leukocytosis    24:  CBC without cytopenias or leukocytosis  CRP less than 0.4 WNL, ESR 53 (high)  ALT 16 WNL, AST 15 WNL, CR 0.93 WNL  3/15/2024:  GRISELDA 1: 320 speckled with reflex antibodies negative  dsDNA by IFA less than 10 WNL, dsDNA 2.3 WNL  C4 26.3 WNL, C3 158.6 WNL  CRP less than 0.4 WNL, ESR 24 (high)  CCP greater than 340 (high)    Acute hepatitis panel and TB testing negative    CBC without cytopenias nor leukocytosis  ALT 13 WNL, AST 15 WNL, CR 0.95 WNL  UA with negative blood/protein, 500 leuk esterase but only 6/10 WBC  Monoclonal protein study without evidence of monoclonal protein      2024:  CTD screen by [facility] equivocal    10/16/2023:  TSH 1.030  CMP with normal renal and hepatic function, gamma gap noted  CBC without cytopenias nor leukocytosis  RF 81 (high)  CRP less than 0.4  Imagin/20/24 CXR:     Impression   CONCLUSION:  1. No acute cardiopulmonary disease       3/15/2024 knee x-rays:  L Knee Impression      No acute fracture or dislocation.  No joint effusion.     Mild periarticular osteopenia, nonspecific, can be seen in inflammatory arthropathy.  No joint space narrowing or osteophyte formation.     Soft tissues are unremarkable.          R Knee Impression      No acute fracture or dislocation.  No joint effusion.     Mild peritoneal osteopenia, nonspecific, can be seen with inflammatory arthropathy.  No significant joint space narrowing or osteophyte formation.     Soft tissues are unremarkable.

## 2025-01-30 NOTE — TELEPHONE ENCOUNTER
Please review;  [facility] protocol failed/ No protocol     Requested Prescriptions   Pending Prescriptions Disp Refills    PAROXETINE 30 MG Oral Tab [Pharmacy Med Name: PAROXETINE 30MG TABLETS] 30 tablet 0     Sig: TAKE 1 TABLET(30 MG) BY MOUTH EVERY MORNING       Psychiatric Non-Scheduled (Anti-Anxiety) Failed - 1/30/2025  4:59 PM        Failed - Depression Screening completed within the past 12 months        Passed - In person appointment or virtual visit in the past 6 mos or appointment in next 3 mos     Recent Outpatient Visits              1 month ago Anxiety    [facility], [address], [place]    Telemedicine    1 month ago Seropositive rheumatoid arthritis (HCC): +GRISELDA/RF/CCP    [facility], [place]    Telemedicine    3 months ago Seropositive rheumatoid arthritis (HCC): +GRISELDA/RF/CCP    [facility] [place]    Office Visit    3 months ago Vertigo    [facility], Jenni Floyd APRN    Office Visit    5 months ago On Depo-Provera [facility]          Future Appointments         Provider Department Appt Notes    In 3 weeks Wilder Ward DO [facility] 2 month follow up  Lupus  Policy advised                    Passed - Medication is active on med list           Future Appointments         Provider Department Appt Notes    In 3 weeks Wilder Ward DO [facility] 2 month follow up  Lupus  Policy advised          Recent Outpatient Visits              1 month ago Anxiety    [facility], [address] [place]    Telemedicine    1 month ago Seropositive rheumatoid arthritis  (HCC): +GRISELDA/RF/CCP    [facility], [facility], Wilder Ivy,     Telemedicine    3 months ago Seropositive rheumatoid arthritis (HCC): +GRISELDA/RF/CCP    [facility], [facility], Wilder Ivy DO    Office Visit    3 months ago Vertigo    [facility], [address], Jenni Floyd APRN    Office Visit    5 months ago On Depo-Provera for contraception    [facility], [address], Jenni Floyd APRN    Office Visit

## 2025-01-31 RX ORDER — PAROXETINE 30 MG/1
30 TABLET, FILM COATED ORAL EVERY MORNING
Qty: 90 TABLET | Refills: 1 | Status: SHIPPED | OUTPATIENT
Start: 2025-01-31

## 2025-02-20 ENCOUNTER — TELEPHONE (OUTPATIENT)
Dept: RHEUMATOLOGY | Facility: CLINIC | Age: 50
End: 2025-02-20

## 2025-02-20 NOTE — TELEPHONE ENCOUNTER
1st no show. Dr. Ward. [facility]/Rheum. 02/20/25. Sent no show letter via [employer].     [facility] to r/s

## 2025-03-05 RX ORDER — MELOXICAM 15 MG/1
15 TABLET ORAL DAILY
Qty: 30 TABLET | Refills: 1 | OUTPATIENT
Start: 2025-03-05

## 2025-03-05 NOTE — TELEPHONE ENCOUNTER
Meloxicam 15m month supply sent to [facility] in [place] on 2024- patient received 30 day supply on 2025

## 2025-04-16 ENCOUNTER — LAB ENCOUNTER (OUTPATIENT)
Dept: LAB | Facility: HOSPITAL | Age: 50
End: 2025-04-16
Attending: INTERNAL MEDICINE
Payer: COMMERCIAL

## 2025-04-16 ENCOUNTER — PATIENT MESSAGE (OUTPATIENT)
Age: 50
End: 2025-04-16

## 2025-04-16 ENCOUNTER — OFFICE VISIT (OUTPATIENT)
Age: 50
End: 2025-04-16
Payer: COMMERCIAL

## 2025-04-16 ENCOUNTER — TELEPHONE (OUTPATIENT)
Age: 50
End: 2025-04-16

## 2025-04-16 VITALS
HEART RATE: 92 BPM | DIASTOLIC BLOOD PRESSURE: 74 MMHG | WEIGHT: 237.81 LBS | BODY MASS INDEX: 40.6 KG/M2 | SYSTOLIC BLOOD PRESSURE: 119 MMHG | HEIGHT: 64 IN

## 2025-04-16 DIAGNOSIS — R79.9 ABNORMAL BLOOD FINDING: ICD-10-CM

## 2025-04-16 DIAGNOSIS — M05.9 SEROPOSITIVE RHEUMATOID ARTHRITIS (HCC): Primary | ICD-10-CM

## 2025-04-16 DIAGNOSIS — Z83.2 FAMILY HISTORY OF AUTOIMMUNE DISORDER: ICD-10-CM

## 2025-04-16 DIAGNOSIS — M06.4 INFLAMMATORY POLYARTHRITIS (HCC): ICD-10-CM

## 2025-04-16 DIAGNOSIS — Z79.899 ENCOUNTER FOR LONG-TERM (CURRENT) USE OF HIGH-RISK MEDICATION: ICD-10-CM

## 2025-04-16 LAB
ALT SERPL-CCNC: 23 U/L (ref 10–49)
AST SERPL-CCNC: 20 U/L (ref ?–34)
BASOPHILS # BLD AUTO: 0.07 X10(3) UL (ref 0–0.2)
BASOPHILS NFR BLD AUTO: 0.9 %
CREAT BLD-MCNC: 0.88 MG/DL (ref 0.55–1.02)
CRP SERPL-MCNC: 0.9 MG/DL (ref ?–1)
DEPRECATED RDW RBC AUTO: 45.3 FL (ref 35.1–46.3)
EGFRCR SERPLBLD CKD-EPI 2021: 81 ML/MIN/1.73M2 (ref 60–?)
EOSINOPHIL # BLD AUTO: 0.15 X10(3) UL (ref 0–0.7)
EOSINOPHIL NFR BLD AUTO: 2 %
ERYTHROCYTE [DISTWIDTH] IN BLOOD BY AUTOMATED COUNT: 13.9 % (ref 11–15)
ERYTHROCYTE [SEDIMENTATION RATE] IN BLOOD: 45 MM/HR (ref 0–20)
HCT VFR BLD AUTO: 35.5 % (ref 35–48)
HGB BLD-MCNC: 12.4 G/DL (ref 12–16)
IMM GRANULOCYTES # BLD AUTO: 0.03 X10(3) UL (ref 0–1)
IMM GRANULOCYTES NFR BLD: 0.4 %
LYMPHOCYTES # BLD AUTO: 1.94 X10(3) UL (ref 1–4)
LYMPHOCYTES NFR BLD AUTO: 25.6 %
MCH RBC QN AUTO: 32.3 PG (ref 26–34)
MCHC RBC AUTO-ENTMCNC: 34.9 G/DL (ref 31–37)
MCV RBC AUTO: 92.4 FL (ref 80–100)
MONOCYTES # BLD AUTO: 0.81 X10(3) UL (ref 0.1–1)
MONOCYTES NFR BLD AUTO: 10.7 %
NEUTROPHILS # BLD AUTO: 4.57 X10 (3) UL (ref 1.5–7.7)
NEUTROPHILS # BLD AUTO: 4.57 X10(3) UL (ref 1.5–7.7)
NEUTROPHILS NFR BLD AUTO: 60.4 %
PLATELET # BLD AUTO: 225 10(3)UL (ref 150–450)
RBC # BLD AUTO: 3.84 X10(6)UL (ref 3.8–5.3)
WBC # BLD AUTO: 7.6 X10(3) UL (ref 4–11)

## 2025-04-16 PROCEDURE — 82565 ASSAY OF CREATININE: CPT | Performed by: INTERNAL MEDICINE

## 2025-04-16 PROCEDURE — 86480 TB TEST CELL IMMUN MEASURE: CPT | Performed by: INTERNAL MEDICINE

## 2025-04-16 PROCEDURE — 86140 C-REACTIVE PROTEIN: CPT | Performed by: INTERNAL MEDICINE

## 2025-04-16 PROCEDURE — 85652 RBC SED RATE AUTOMATED: CPT | Performed by: INTERNAL MEDICINE

## 2025-04-16 PROCEDURE — 84460 ALANINE AMINO (ALT) (SGPT): CPT | Performed by: INTERNAL MEDICINE

## 2025-04-16 PROCEDURE — 84450 TRANSFERASE (AST) (SGOT): CPT | Performed by: INTERNAL MEDICINE

## 2025-04-16 PROCEDURE — 36415 COLL VENOUS BLD VENIPUNCTURE: CPT | Performed by: INTERNAL MEDICINE

## 2025-04-16 PROCEDURE — 85025 COMPLETE CBC W/AUTO DIFF WBC: CPT | Performed by: INTERNAL MEDICINE

## 2025-04-16 RX ORDER — FOLIC ACID 1 MG/1
1 TABLET ORAL DAILY
Qty: 90 TABLET | Refills: 3 | Status: SHIPPED | OUTPATIENT
Start: 2025-04-16

## 2025-04-16 RX ORDER — METHOTREXATE 2.5 MG/1
TABLET ORAL
Qty: 84 TABLET | Refills: 3 | Status: SHIPPED | OUTPATIENT
Start: 2025-04-16

## 2025-04-16 RX ORDER — PREDNISONE 5 MG/1
TABLET ORAL
Qty: 90 TABLET | Refills: 0 | Status: SHIPPED | OUTPATIENT
Start: 2025-04-16 | End: 2025-05-28

## 2025-04-16 NOTE — TELEPHONE ENCOUNTER
Hello-this patient has seropositive rheumatoid arthritis with persistent symptoms despite methotrexate.  Can we please look into a PA for adalimumab (okay for biosimilar's)?  Thank you.      Adalimumab: SUBQ: 40 mg every other week    Last QuantiFERON TB testing: 3/15/2024 > reordered  Last Hepatitis testing: 3/15/2024      Current Medications:  Methotrexate 2.5 mg tablets x 10 tablets once weekly with folic acid 1 mg daily-started at 5 tablets once weekly 4/10/2024. Inc to 7 qw 6/2024, however, off for about 3w in Sept 2024 given insurance    PRN P.o. prednisone 5 mg daily-prednisone responsive, most response to higher doses.    Medication History:  Prednisone-responsive, patient previously treated for asthma flares with improvement of her arthralgias    PRN Ibuprofen, Naproxen, Tylenol- Ineffective

## 2025-04-16 NOTE — PROGRESS NOTES
RHEUMATOLOGY CLINIC- FOLLOW UP    Arielle Kidd is a 49 year old female.    ASSESSMENT/PLAN:       ICD-10-CM    1. Seropositive rheumatoid arthritis (HCC): +GRISELDA/RF/CCP  M05.9 Quantiferon TB Plus     ALT(SGPT)     AST (SGOT)     CBC With Differential With Platelet     Creatinine, Serum     C-Reactive Protein     Sed Rate, Westergren (Automated)      2. Inflammatory polyarthritis (HCC)  M06.4 Quantiferon TB Plus     ALT(SGPT)     AST (SGOT)     CBC With Differential With Platelet     Creatinine, Serum     C-Reactive Protein     Sed Rate, Westergren (Automated)      3. Gamma Gap  R79.9 Quantiferon TB Plus     ALT(SGPT)     AST (SGOT)     CBC With Differential With Platelet     Creatinine, Serum     C-Reactive Protein     Sed Rate, Westergren (Automated)      4. Family history of autoimmune disorder  Z83.2 Quantiferon TB Plus     ALT(SGPT)     AST (SGOT)     CBC With Differential With Platelet     Creatinine, Serum     C-Reactive Protein     Sed Rate, Westergren (Automated)      5. Encounter for long-term (current) use of high-risk medication  Z79.899 Quantiferon TB Plus     ALT(SGPT)     AST (SGOT)     CBC With Differential With Platelet     Creatinine, Serum     C-Reactive Protein     Sed Rate, Westergren (Automated)            DISCUSSION:  Patient presents for follow-up for seropositive RA (positive GRISELDA, CCP, RF, with periarticular osteopenia), initially doing better on methotrexate, however, has persistent symptoms currently despite max dose methotrexate at 10 tablets once weekly.  Therefore, discussed with patient risk and benefit of escalation of therapy to immunologic/TNF inhibitor such as Humira to which she is agreeable.  Will look into PA.  Will prescribe p.o. prednisone taper in the meantime.  As patient did not feel better when going from methotrexate at 7 tablets once weekly to 10 tablets once weekly, she is requesting to decrease methotrexate back to 7 tablets once weekly which I do think is reasonable.   However, discussed with patient to increase back to 10 tablets once weekly if symptoms recur on lower dosing.        PLAN:  - PA for adalimumab 40 mg every other week       We discussed the risks and benefit of anti-TNF therapy w/ adalimumab. The risks of the medication include immunosuppression, with increased risk for all infection, but primarily upper respiratory infections. Anti-TNF therapy also has an increased risk of malignancy, but it has been demonstrated that the continued pro-inflammatory state involved with autoimmune disease has an increased risk of malignancy left untreated. This agent is considered a biologic drug, and as such runs the risk of allergic reaction & injection site irritation. Other adverse effects include demyelinating disease. Additionally, discussed with patient that it may take about 3 months for this medication to become therapeutic. At this time, the patient has expressed agreement that the benefits of therapy outweigh the risks of drug administration.    - PO Prednisone taper x12d starting at 20 mg q AM. Afterwards, PRN Prednisone 5-10 mg q AM for breakthrough pain  -Dec Methotrexate as follows: methotrexate 2.5 mg tablets x 7  tabs qw with daily folic acid 1 mg. Side effects of MTX prev discussed including possible alopecia, hepatotoxicity, mucositis, GI upset, as well as immunosuppressive effects.  Also, emphasized importance of avoiding EtOH while on MTX.  Explained that MTX may take several weeks to months for noticeable symptom improvement.  - Discussed with patient, given possible immunosuppressive effects of MTX, the importance of keeping vaccinations up-to-date.    - Consult/evaluation communicated with referring physician/provider.    I will notify patient pending PA for adalimumab.  Otherwise, discussed the patient that she is due for lab monitoring with plans for close lab monitoring going forward.  Patient to follow-up around October with repeat labs drawn prior as  well.    Wilder Ward,   4/16/2025   12:29 PM    There is a longitudinal care relationship with me, the care plan reflects the ongoing nature of the continuous relationship of care, and the medical record indicates that there is ongoing treatment of a serious/complex medical condition which I am currently managing.  is Applicable.     Discussed with patient that they are on chronic treatment with a high-risk medication that requires close lab monitoring for possible drug toxicity.  Additionally, discussed with patient give the possible immunosuppressive effects of their medication as well as their underlying hyper-inflammatory state, the importance of keeping vaccinations and age-appropriate screenings up-to-date, given increased risk of complications and malignancies seen in these patient populations.  Patient to f/u with repeat labs drawn prior (standing labs ordered).  Last QuantiFERON TB testing: 3/15/2024 >reordered  Last Hepatitis testing: 3/15/2024        SUBJECTIVE:     4/16/25 Follow-Up: Patient with persistent symptoms despite increasing methotrexate at her last appointment.  Especially notes arthralgias with swelling when trying avoid prednisone.  Finds prednisone 5 mg daily less helpful than higher doses.        Current Medications:  Methotrexate 2.5 mg tablets x 10 tablets once weekly with folic acid 1 mg daily-started at 5 tablets once weekly 4/10/2024. Inc to 7 qw 6/2024, however, off for about 3w in Sept 2024 given insurance    PRN P.o. prednisone 5 mg daily-prednisone responsive, most response to higher doses.    Medication History:  Prednisone-responsive, patient previously treated for asthma flares with improvement of her arthralgias    PRN Ibuprofen, Naproxen, Tylenol- Ineffective    Interval History:     3/14/24 Initial Consult: I had the pleasure of seeing Arielle Kidd on 3/15/2024 as a new outpatient consultation for positive RF serologies. The patient was originally referred by  LISS Ramsay.     48 year old female w/ PMH asthma, goiter who presents to clinic today. Patient originally evaluated at her primary care office for arthralgias/myalgias worsening since December 2023.  She describes back, knee, hand pain with associated swelling described as an aching pain that is worse even while resting.  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  She has AM stiffness for about 1 hour.  Also reports generalized fatigue.  Symptoms are worse at night and patient attempting to treat with as needed ibuprofen, naproxen, Tylenol.  ROS negative for unexplained fever, chills, unintentional weight loss, Raynaud's phenomenon, sicca symptoms (however, patient notes recent complicated dental history given weak and broken teeth), hematuria.  She notes an itchy rash alongside her chest that she first noted during the summertime that comes and goes.  She has a strong family history of autoimmune diseases including a mother with SLE/RA, maternal uncle with SLE, maternal aunts with SLE, and a maternal grandmother with RA.  Interestingly, patient was treated for an asthma exacerbation October 2023 with prednisone 80 mg daily with significant improvement of her symptoms.    4/10/24 virtual Follow-Up: Patient reporting significant improvement of symptoms since the p.o. prednisone taper, especially so at the higher doses.  Lowest effective dose appears to be around 10 mg daily with recurrence of symptoms around 5 mg daily.  She presents virtually today to discuss recent lab work and imaging.    6/20/24 Follow-Up: Patient recently had dental surgery related to dentures and is on antibiotics so held her last dose of methotrexate.  Otherwise, notes overall improvement in her arthralgias since starting methotrexate.  She did hold her prednisone dose for a couple days given her mouth soreness but noted the arthralgias did  not flareup significantly.      10/11/24 Follow-Up: Patient lost insurance last month so was off methotrexate for about 3 weeks.  Did experience a flare of her arthralgias with joint swelling around this time as well.  Resumed 10/1 with improvement.  However, does continue on prednisone daily.    12/19/24 Follow-Up: Patient has been trying to minimize her prednisone use with last dose about 1 month ago, however, notes about 4 days a week that prednisone would likely be helpful given worsening of her arthralgias.  Unfortunately, ran out of methotrexate about 2 weeks ago so that was her last dose.  Continues to have arthralgias.  HISTORY:  Past Medical History:    Anxiety    Asthma ([facility])      Social Hx Reviewed   Family Hx Reviewed     Medications (Active prior to today's visit):  Current Outpatient Medications   Medication Sig Dispense Refill    PARoxetine 30 MG Oral Tab Take 1 tablet (30 mg total) by mouth every morning. 90 tablet 1    hydrOXYzine 50 MG Oral Tab Take 1.5 tablets (75 mg total) by mouth nightly as needed for Anxiety (Can increase to 100 mg nightly if symptoms worsening/not improving in 2-3 days). 30 tablet 0    predniSONE 5 MG Oral Tab Take 1 tablet (5 mg total) by mouth daily as needed. Take with breakfast as needed in the morning for breakthrough pain. 90 tablet 2    methotrexate 2.5 MG Oral Tab Take 10 tablets of Methotrexate 2.5 mg once weekly with food. 40 tablet 2    MELOXICAM 15 MG Oral Tab TAKE 1 TABLET(15 MG) BY MOUTH DAILY 30 tablet 1    meclizine 25 MG Oral Tab Take 1 tablet (25 mg total) by mouth 3 (three) times daily as needed. 15 tablet 0    montelukast 10 MG Oral Tab Take 1 tablet (10 mg total) by mouth daily. 90 tablet 3    folic acid 1 MG Oral Tab Take 1 tablet (1 mg total) by mouth daily. 90 tablet 2    fluticasone propionate 50 MCG/ACT Nasal Suspension 2 sprays by Each Nare route daily. 1 each 0    cetirizine 10 MG Oral Tab Take 1 tablet (10 mg total) by mouth daily. 30 tablet 0     SYMBICORT 160-4.5 MCG/ACT Inhalation Aerosol Inhale 2 puffs into the lungs 2 (two) times daily. 10.2 g 2    albuterol 108 (90 Base) MCG/ACT Inhalation Aero Soln Inhale 2 puffs into the lungs every 4 (four) hours as needed for Wheezing. 18 g 2    Spacer/Aero-Holding Chambers Does not apply Device Use with inhaler as needed 1 each 0       Allergies:  No Known Allergies      ROS:   Review of Systems   Constitutional:  Negative for chills, fatigue and fever.   HENT:  Negative for congestion, hearing loss, mouth sores, nosebleeds and trouble swallowing.    Eyes:  Negative for photophobia, pain, redness and visual disturbance.   Respiratory:  Negative for cough and shortness of breath.    Cardiovascular:  Negative for chest pain, palpitations and leg swelling.   Gastrointestinal:  Negative for abdominal pain, blood in stool, diarrhea and nausea.   Endocrine: Negative for cold intolerance and heat intolerance.   Genitourinary:  Negative for dysuria, frequency and hematuria.   Musculoskeletal:  Positive for arthralgias and joint swelling. Negative for back pain, gait problem, myalgias, neck pain and neck stiffness.   Skin:  Negative for color change and rash.   Neurological:  Negative for dizziness, weakness, numbness and headaches.   Psychiatric/Behavioral:  Negative for confusion and dysphoric mood.         PHYSICAL EXAM:     Constitutional:  Well developed, Well nourished, No acute distress  HENT:  Normocephalic, Atraumatic, Bilateral external ears normal, Oropharynx moist, No oral exudates.  Neck: Normal range of motion, No tenderness, Supple, No stridor.  Eyes:  PERRL, EOMI, Conjunctiva normal, No discharge.  Respiratory:  Normal breath sounds, No respiratory distress, No wheezing.  Cardiovascular:  Normal heart rate, Normal rhythm, No murmurs, No rubs, No gallops.  GI:  Bowel sounds normal, Soft, No tenderness, No masses, No pulsatile masses.  : No CVA tenderness.  Musculoskeletal:  Good range of motion in all major  joints. A comprehensive 28 count joint exam was done and was negative for swelling or tenderness except as noted. Inspections for misalignment, asymmetry, crepitation, defects, tenderness, masses, nodules, effusions, range of motion, and stability in the upper and lower extremities bilaterally are all normal unless noted.           Integument:  Warm, Dry, No erythema, No rash.  Lymphatic:  No lymphadenopathy noted.  Neurologic:  Alert & oriented x 3, Normal motor function, Normal sensory function, No focal deficits noted.  Psychiatric:  Affect normal, Judgment normal, Mood normal.     LABS:     Prior lab work reviewed and notable for:    10/4/2024:  CMP with BUN 9, CR 0.94, LFTs are elevated, no gamma gap noted    10/2/2024:  CMP with BUN 10, CR 0.9, LFTs nonelevated  CBC without cytopenias or leukocytosis    24:  CBC without cytopenias or leukocytosis  CRP less than 0.4 WNL, ESR 53 (high)  ALT 16 WNL, AST 15 WNL, CR 0.93 WNL    3/15/2024:  GRISELDA 1: 320 speckled with reflex antibodies negative  dsDNA by IFA less than 10 WNL, dsDNA 2.3 WNL  C4 26.3 WNL, C3 158.6 WNL  CRP less than 0.4 WNL, ESR 24 (high)  CCP greater than 340 (high)    Acute hepatitis panel and TB testing negative    CBC without cytopenias nor leukocytosis  ALT 13 WNL, AST 15 WNL, CR 0.95 WNL  UA with negative blood/protein, 500 leuk esterase but only 6/10 WBC  Monoclonal protein study without evidence of monoclonal protein      2024:  CTD screen by [facility]ONSO equivocal    10/16/2023:  TSH 1.030  CMP with normal renal and hepatic function, gamma gap noted  CBC without cytopenias nor leukocytosis  RF 81 (high)  CRP less than 0.4  Imagin/20/24 CXR:     Impression   CONCLUSION:  1. No acute cardiopulmonary disease       3/15/2024 knee x-rays:  L Knee Impression      No acute fracture or dislocation.  No joint effusion.     Mild periarticular osteopenia, nonspecific, can be seen in inflammatory arthropathy.  No joint space narrowing or osteophyte  formation.     Soft tissues are unremarkable.          R Knee Impression      No acute fracture or dislocation.  No joint effusion.     Mild peritoneal osteopenia, nonspecific, can be seen with inflammatory arthropathy.  No significant joint space narrowing or osteophyte formation.     Soft tissues are unremarkable.

## 2025-04-18 LAB
M TB IFN-G CD4+ T-CELLS BLD-ACNC: 0.06 IU/ML
M TB TUBERC IFN-G BLD QL: NEGATIVE
M TB TUBERC IGNF/MITOGEN IGNF CONTROL: >10 IU/ML
QFT TB1 AG MINUS NIL: 0.01 IU/ML
QFT TB2 AG MINUS NIL: 0 IU/ML

## 2025-04-18 RX ORDER — ADALIMUMAB 40MG/0.4ML
40 KIT SUBCUTANEOUS
Qty: 2 EACH | Refills: 5 | Status: SHIPPED | OUTPATIENT
Start: 2025-04-18

## 2025-04-18 NOTE — TELEPHONE ENCOUNTER
PA start    Prior authorization for: Humira     Medication form: 40 mg pen     Submission method: [employer]    Tracking #:    QF-TB result:   Component      Latest Ref Rng 4/16/2025   Quantiferon TB NIL      IU/mL 0.06    Quantiferon-TB1 Minus NIL      IU/mL 0.01    Quantiferon-TB2 Minus NIL      IU/mL 0.00    Quantiferon TB Mitogen minus NIL      IU/mL >10.00    Quantiferon TB Result      Negative  Negative        Component      Latest Ref Rng 3/15/2024   HEPATITIS A AB, IGM      Nonreactive   Nonreactive    HBc IgM AB      Nonreactive   Nonreactive    HBSAg Screen      Nonreactive   Nonreactive    HCV AB      Nonreactive   Nonreactive

## 2025-04-18 NOTE — TELEPHONE ENCOUNTER
Script pended for review. Will pt require teaching?    PA Approved    Prior authorization for: Humira     Medication form: 40 mg pen     Approval #:  [id number]    Approved dates: HUMIRA PEN INJ 40/0.4ML is approved through 04/18/2026    Pharmacy for medication: [facility]

## 2025-04-22 ENCOUNTER — TELEPHONE (OUTPATIENT)
Age: 50
End: 2025-04-22

## 2025-04-22 NOTE — TELEPHONE ENCOUNTER
Current Outpatient Medications   Medication Sig Dispense Refill    Adalimumab (HUMIRA, 2 PEN,) 40 MG/0.4ML Subcutaneous Auto-injector Kit Inject 40 mg into the skin every 14 (fourteen) days. 2 each 5       This is the first time [facility] is filling this medication for patient. Please submit starter dose if appropriate.

## 2025-04-24 NOTE — TELEPHONE ENCOUNTER
Name and  verified. Pt given appointment for teaching.      Future Appointments   Date Time Provider Department Center   2025  4:00 PM [facility] RN RHEUMATOLOGY [facility]   2025 11:00 AM Wilder Ward DO [facility]

## 2025-04-28 ENCOUNTER — NURSE ONLY (OUTPATIENT)
Age: 50
End: 2025-04-28

## 2025-04-28 DIAGNOSIS — M05.9 SEROPOSITIVE RHEUMATOID ARTHRITIS (HCC): Primary | ICD-10-CM

## 2025-04-28 RX ORDER — ADALIMUMAB 40MG/0.4ML
40 KIT SUBCUTANEOUS ONCE
Qty: 1 EACH | Refills: 0 | COMMUNITY
Start: 2025-04-28 | End: 2025-04-28

## 2025-04-28 NOTE — TELEPHONE ENCOUNTER
Patient asking if she is supposed to continue methotrexate? She thought she was supposed to stop the methotrexate and just be on the Humira. Please advise prefers not to have to take all those pills.

## 2025-04-28 NOTE — TELEPHONE ENCOUNTER
Hello-at our last appointment, we talked about decreasing methotrexate from 10 tablets once weekly to 7 tablets once weekly to start.  There was initially some improvement on methotrexate.  However, we can see as well how she feels off methotrexate completely with only adalimumab but her arthritis might flareup.  Thank you.

## 2025-04-28 NOTE — PROGRESS NOTES
Patient came in for [facility] teaching. Name and  verified. Patient educated on proper handling/storage/disposal of medications. Patient informed of proper injection and aseptic technique. Patient educated on potential side effects. Educational materials given to patient on medication.  Patient practiced several times with demo pen with great technique. Patient then injected in to Left lower abdomen with excellent technique under RN supervision. Patient tolerated medication well. Patient questions and concerns answered. Patient instructed to call us with any further questions. 40 min spent in education session. Patient aware to complete monitoring labs in 1 month and then follow up with provider. LOT# [id number] EXP 10/31/2025 was sample provided to patient in office

## 2025-04-30 NOTE — TELEPHONE ENCOUNTER
Please Review     Please review pended refill request as unable to refill due to high/very high interaction warning copied here:      Current Warnings (2 unfiltered, 1 filtered)Show filtered (1)  Very High  Drug-Drug: methotrexate and MeloxicamNonsteroidal anti-inflammatory agents (eg, NSAIDs) may increase the serum concentration of methotrexate.  Details  Override reason        Meloxicam 15 MG Oral Tab  Prescription. Reordered.  methotrexate 2.5 MG Oral TabPrescription. Active.  Discontinue  High  Drug-Drug: PARoxetine and MeloxicamToxic effects may be increased with concurrent administration of NSAIDs and Selective Serotonin Reuptake Inhibitors. The risk of upper gastrointestinal bleeding may be increased. Patients taking both drugs concurrently should be educated about the signs and symptoms of GI bleeding.  Details  Override reason        Meloxicam 15 MG Oral Tab  Prescription. Reordered.  PARoxetine 30 MG Oral TabPrescription. Active.

## 2025-05-01 ENCOUNTER — PATIENT MESSAGE (OUTPATIENT)
Dept: FAMILY MEDICINE CLINIC | Facility: CLINIC | Age: 50
End: 2025-05-01

## 2025-05-01 ENCOUNTER — NURSE TRIAGE (OUTPATIENT)
Dept: FAMILY MEDICINE CLINIC | Facility: CLINIC | Age: 50
End: 2025-05-01

## 2025-05-01 NOTE — TELEPHONE ENCOUNTER
Arielle Jones Rn Triage (supporting LISS Bae)4 hours ago (12:40 PM)       I started the HUMIRA shot on Monday now I'm starting to get a Tinkling sharp pain at the bottom of my feet. I think it was before the shot but it comes and goes

## 2025-05-01 NOTE — TELEPHONE ENCOUNTER
Called the patient in regards to her my chart message below. The patient states it started on Monday she developed a painful sensation like there is something stuck in her foot right by her big toe like glass.she said the pain comes and goes very quickly. No other numbness or tingling anywhere else. She believes it happened before her shot but she cannot remember. She also is worried about her H1C and being prediabetic. She has an appointment on 05/12 with jenni Almonte. Jenni almonte please advise, thank you.

## 2025-05-02 NOTE — TELEPHONE ENCOUNTER
Patient has not read Dr. Ward's message yet. Left detailed message (okay per Release of Information) to check MyChart, and asked to call back either Rheumatology or our office if with any questions or concerns.

## 2025-05-03 RX ORDER — MELOXICAM 15 MG/1
15 TABLET ORAL DAILY
Qty: 90 TABLET | Refills: 0 | OUTPATIENT
Start: 2025-05-03

## 2025-05-07 ENCOUNTER — HOSPITAL ENCOUNTER (OUTPATIENT)
Age: 50
Discharge: HOME | End: 2025-05-07
Payer: COMMERCIAL

## 2025-05-07 ENCOUNTER — TELEPHONE (OUTPATIENT)
Age: 50
End: 2025-05-07

## 2025-05-07 VITALS
RESPIRATION RATE: 20 BRPM | HEART RATE: 105 BPM | TEMPERATURE: 97 F | DIASTOLIC BLOOD PRESSURE: 76 MMHG | HEIGHT: 64 IN | BODY MASS INDEX: 36.7 KG/M2 | SYSTOLIC BLOOD PRESSURE: 135 MMHG | OXYGEN SATURATION: 95 % | WEIGHT: 215 LBS

## 2025-05-07 DIAGNOSIS — G44.209 TENSION HEADACHE: ICD-10-CM

## 2025-05-07 DIAGNOSIS — H81.10 BENIGN PAROXYSMAL POSITIONAL VERTIGO, UNSPECIFIED LATERALITY: Primary | ICD-10-CM

## 2025-05-07 PROCEDURE — S0119 ONDANSETRON 4 MG: HCPCS

## 2025-05-07 PROCEDURE — 99214 OFFICE O/P EST MOD 30 MIN: CPT

## 2025-05-07 PROCEDURE — 96372 THER/PROPH/DIAG INJ SC/IM: CPT

## 2025-05-07 RX ORDER — KETOROLAC TROMETHAMINE 30 MG/ML
60 INJECTION, SOLUTION INTRAMUSCULAR; INTRAVENOUS ONCE
Status: COMPLETED | OUTPATIENT
Start: 2025-05-07 | End: 2025-05-07

## 2025-05-07 RX ORDER — ACETAMINOPHEN 500 MG
1000 TABLET ORAL ONCE
Status: DISCONTINUED | OUTPATIENT
Start: 2025-05-07 | End: 2025-05-07

## 2025-05-07 RX ORDER — ACETAMINOPHEN 500 MG
1000 TABLET ORAL ONCE
Status: COMPLETED | OUTPATIENT
Start: 2025-05-07 | End: 2025-05-07

## 2025-05-07 RX ORDER — MECLIZINE HYDROCHLORIDE 25 MG/1
25 TABLET ORAL 3 TIMES DAILY PRN
Qty: 15 TABLET | Refills: 0 | Status: SHIPPED | OUTPATIENT
Start: 2025-05-07

## 2025-05-07 RX ORDER — ONDANSETRON 4 MG/1
4 TABLET, ORALLY DISINTEGRATING ORAL ONCE
Status: COMPLETED | OUTPATIENT
Start: 2025-05-07 | End: 2025-05-07

## 2025-05-07 RX ORDER — ONDANSETRON 4 MG/1
4 TABLET, ORALLY DISINTEGRATING ORAL EVERY 4 HOURS PRN
Qty: 10 TABLET | Refills: 0 | Status: SHIPPED | OUTPATIENT
Start: 2025-05-07 | End: 2025-05-14

## 2025-05-07 RX ORDER — MECLIZINE HYDROCHLORIDE 25 MG/1
25 TABLET ORAL 3 TIMES DAILY PRN
Qty: 20 TABLET | Refills: 0 | Status: SHIPPED | OUTPATIENT
Start: 2025-05-07

## 2025-05-07 RX ORDER — METHYLPREDNISOLONE 4 MG/1
TABLET ORAL
Qty: 1 EACH | Refills: 0 | Status: SHIPPED | OUTPATIENT
Start: 2025-05-07

## 2025-05-07 NOTE — DISCHARGE INSTRUCTIONS
Consider Vestibular physical therapy  Start Medrol dose pack tomorrow morning  Continue Meclizine and Zofran as needed    ER if symptoms worsen or concerning symptoms develop

## 2025-05-07 NOTE — ED PROVIDER NOTES
Patient Seen in: [facility]      History     Chief Complaint   Patient presents with    Dizziness     Stated Complaint: Headache - I'm having this vertigo headache really bad.    Subjective:   HPI    Patient is a pleasant 49-year-old female here for evaluation of dizziness, nausea and headache.  Vertigo initially started 2 days ago.  Took meclizine last evening with relief in symptoms.  Patient states she did not take meclizine today because this makes her tired and she was at work.  Does report associated nausea.  Denies emesis episodes.    Vertigo worsens with position changes and head movements. Sees PCP on Monday for Humira follow up.      Headache- has not taken medication for headache.  Headache started today.  No URI sx.  No vision changes, weakness or slurred speech.       Objective:     No pertinent past medical history.            No pertinent past surgical history.              No pertinent social history.            Review of Systems    Positive for stated complaint: Headache - I'm having this vertigo headache really bad.  Other systems are as noted in HPI.  Constitutional and vital signs reviewed.      All other systems reviewed and negative except as noted above.                  Physical Exam     ED Triage Vitals [05/07/25 1828]   /76   Pulse 105   Resp 20   Temp 97.4 °F (36.3 °C)   Temp src Oral   SpO2 95 %   O2 Device None (Room air)       Current Vitals:   Vital Signs  BP: 135/76  Pulse: 105  Resp: 20  Temp: 97.4 °F (36.3 °C)  Temp src: Oral    Oxygen Therapy  SpO2: 95 %  O2 Device: None (Room air)        Physical Exam  Vitals and nursing note reviewed.   Constitutional:       Appearance: She is well-developed.   HENT:      Mouth/Throat:      Mouth: Mucous membranes are moist.   Eyes:      General: No visual field deficit.     Extraocular Movements: Extraocular movements intact.      Pupils: Pupils are equal, round, and reactive to light.   Cardiovascular:      Rate and  Rhythm: Normal rate and regular rhythm.   Pulmonary:      Effort: Pulmonary effort is normal.   Musculoskeletal:      Cervical back: Normal range of motion.   Neurological:      General: No focal deficit present.      Mental Status: She is alert and oriented to person, place, and time. Mental status is at baseline.      Cranial Nerves: Cranial nerves 2-12 are intact. No cranial nerve deficit or facial asymmetry.      Sensory: Sensation is intact.      Motor: Motor function is intact.      Coordination: Coordination is intact.                 ED Course   Labs Reviewed - No data to display                 MDM              Medical Decision Making  Differentials include but are not limited to benign vertigo, migraine, tension headache, anemia, electrolyte imbalance, dehydration and acute intracranial process.  Patient is well-appearing, tolerating p.o. intake, there are no neurofocal deficits.  Patient given Zofran, Toradol IM and Tylenol here.  Encouraged starting Benadryl at home.  Continue meclizine and Zofran as needed.  Close outpatient follow-up as scheduled with PCP on Monday.  We discussed possible need of vestibular physical therapy.  Start Medrol Dosepak in the morning work note given.  Strict ER precautions reviewed.  Patient agrees with plan of care.  All questions answered to patient's satisfaction         Disposition and Plan     Clinical Impression:  1. Benign paroxysmal positional vertigo, unspecified laterality    2. Tension headache         Disposition:  Discharge  5/7/2025  7:00 pm    Follow-up:  Mee Ventura MD  [address] [place] [address] [id number] [phone]      as scheduled Monday          Medications Prescribed:  Discharge Medication List as of 5/7/2025  7:08 PM        START taking these medications    Details   methylPREDNISolone (MEDROL) 4 MG Oral Tablet Therapy Pack Dosepack: take as directed, Normal, Disp-1 each, R-0      !! meclizine 25 MG Oral Tab Take 1 tablet (25 mg total) by  mouth 3 (three) times daily as needed for Dizziness or Nausea., Normal, Disp-20 tablet, R-0      ondansetron 4 MG Oral Tablet Dispersible Take 1 tablet (4 mg total) by mouth every 4 (four) hours as needed for Nausea., Normal, Disp-10 tablet, R-0       !! - Potential duplicate medications found. Please discuss with provider.          Supplementary Documentation:

## 2025-05-07 NOTE — ED QUICK NOTES
Patient states she has an appointment with PMD Monday , possibly she should be off work until then. Patient c/o room spinning

## 2025-05-07 NOTE — TELEPHONE ENCOUNTER
Patient requesting work letter   +2x3cm area of induration to left buttock near crease of upper thigh with central area of fluctuance.  Also +1cm area of induration to right buttock with no fluctuance.

## 2025-05-07 NOTE — ED INITIAL ASSESSMENT (HPI)
Patient reports vertigo x 2 days , no improvement with meclizine once a day \" can't take it while I am working\"

## 2025-05-12 ENCOUNTER — LAB ENCOUNTER (OUTPATIENT)
Dept: LAB | Age: 50
End: 2025-05-12
Payer: COMMERCIAL

## 2025-05-12 ENCOUNTER — OFFICE VISIT (OUTPATIENT)
Dept: FAMILY MEDICINE CLINIC | Facility: CLINIC | Age: 50
End: 2025-05-12
Payer: COMMERCIAL

## 2025-05-12 VITALS
DIASTOLIC BLOOD PRESSURE: 75 MMHG | BODY MASS INDEX: 40.63 KG/M2 | OXYGEN SATURATION: 95 % | RESPIRATION RATE: 20 BRPM | HEIGHT: 64 IN | TEMPERATURE: 97 F | WEIGHT: 238 LBS | HEART RATE: 104 BPM | SYSTOLIC BLOOD PRESSURE: 129 MMHG

## 2025-05-12 DIAGNOSIS — M05.79 RHEUMATOID ARTHRITIS INVOLVING MULTIPLE SITES WITH POSITIVE RHEUMATOID FACTOR (HCC): Primary | ICD-10-CM

## 2025-05-12 DIAGNOSIS — R73.03 PREDIABETES: ICD-10-CM

## 2025-05-12 DIAGNOSIS — E66.813 CLASS 3 SEVERE OBESITY WITH SERIOUS COMORBIDITY AND BODY MASS INDEX (BMI) OF 40.0 TO 44.9 IN ADULT, UNSPECIFIED OBESITY TYPE: ICD-10-CM

## 2025-05-12 DIAGNOSIS — G43.909 MIGRAINE WITHOUT STATUS MIGRAINOSUS, NOT INTRACTABLE, UNSPECIFIED MIGRAINE TYPE: ICD-10-CM

## 2025-05-12 LAB — HEMOGLOBIN A1C: 6.1 % (ref 4.3–5.6)

## 2025-05-12 RX ORDER — TIRZEPATIDE 2.5 MG/.5ML
2.5 INJECTION, SOLUTION SUBCUTANEOUS WEEKLY
Qty: 2 ML | Refills: 0 | Status: SHIPPED | OUTPATIENT
Start: 2025-05-12

## 2025-05-12 RX ORDER — DOCUSATE SODIUM 100 MG/1
100 CAPSULE, LIQUID FILLED ORAL 2 TIMES DAILY
Qty: 30 CAPSULE | Refills: 2 | Status: SHIPPED | OUTPATIENT
Start: 2025-05-12

## 2025-05-12 NOTE — PROGRESS NOTES
HPI:    Patient ID: Arielle Kidd is a 49 year old female.    HPI     Patient here in office for immediate care follow-up.  Was seen on 5/7/2025 with complaint of headache/vertigo.  Given ketorolac IM injection in immediate care.  Discharged with Medrol Dosepak, Zofran, and meclizine as needed.  Per patient, migraine/vertigo has resolved since immediate care visit.  Denies nausea or vomiting.       Currently under the care of rheumatology for rheumatoid arthritis.  Recently started on Humira injection.  Reports intermittent paresthesia of feet after starting medication.  Concerned about prediabetes and continuous use of prednisone for rheumatoid arthritis.  Last hemoglobin A1c 5.9.       Reports increased weight gain due to oral steroids.  States it is difficult to exercise since rheumatoid arthritis not under control, just starting Humira injection.  Trying to watch diet closely. Interested in starting weight loss medication.      Wt Readings from Last 6 Encounters:   05/12/25 238 lb (108 kg)   05/07/25 215 lb (97.5 kg)   04/16/25 237 lb 12.8 oz (107.9 kg)   10/11/24 230 lb (104.3 kg)   10/04/24 230 lb (104.3 kg)   10/02/24 225 lb (102.1 kg)           Review of Systems   Constitutional: Negative.    Respiratory: Negative.     Cardiovascular: Negative.    Musculoskeletal:  Positive for arthralgias.   Skin: Negative.    Neurological: Negative.    Psychiatric/Behavioral: Negative.            Current Medications[1]  Allergies:Allergies[2]   /75   Pulse 104   Temp 96.7 °F (35.9 °C) (Temporal)   Resp 20   Ht 5' 4\" (1.626 m)   Wt 238 lb (108 kg)   SpO2 95%   BMI 40.85 kg/m²   Body mass index is 40.85 kg/m².  PHYSICAL EXAM:   Physical Exam  Vitals reviewed.   Constitutional:       General: She is not in acute distress.     Appearance: Normal appearance. She is not ill-appearing.   Cardiovascular:      Rate and Rhythm: Normal rate and regular rhythm.      Heart sounds: Normal heart sounds. No murmur heard.      No friction rub. No gallop.   Pulmonary:      Effort: Pulmonary effort is normal. No respiratory distress.      Breath sounds: Normal breath sounds. No stridor. No wheezing, rhonchi or rales.   Chest:      Chest wall: No tenderness.   Skin:     General: Skin is warm.      Findings: No rash.   Neurological:      General: No focal deficit present.      Mental Status: She is alert and oriented to person, place, and time.   Psychiatric:         Mood and Affect: Mood normal.         Behavior: Behavior normal.         Thought Content: Thought content normal.         Judgment: Judgment normal.                ASSESSMENT/PLAN:   1. Rheumatoid arthritis involving multiple sites with positive rheumatoid factor (HCC)  -Continue present management  - Follow-up with rheumatology as recommended    2. Class 3 severe obesity with serious comorbidity and body mass index (BMI) of 40.0 to 44.9 in adult, unspecified obesity type  -Will start Zepbound 2.5 mg weekly. Send My Chart message in 3 weeks regarding medication response/side effects. If minimal side effects will increase dosage of medication   - Comp Metabolic Panel (14)  - TSH W Reflex To Free T4 [E]; Future    3. Prediabetes  - Hemoglobin A1C checked in office and was 5.1  - Informed patient that Zepbound 2.5 mg weekly will also help regulae blood glucose and assist wit weight loss   - POC Glycohemoglobin [73188]  - Insulin [E]; Future    4. Migraine without status migrainosus, not intractable, unspecified migraine type  - Resolved       Orders Placed This Encounter   Procedures    Comp Metabolic Panel (14)    TSH W Reflex To Free T4 [E]    POC Glycohemoglobin [87036]    Insulin [E]       Meds This Visit:  Requested Prescriptions     Signed Prescriptions Disp Refills    Tirzepatide-Weight Management (ZEPBOUND) 2.5 MG/0.5ML Subcutaneous Solution Auto-injector 2 mL 0     Sig: Inject 2.5 mg into the skin once a week.    docusate sodium (COLACE) 100 MG Oral Cap 30 capsule 2      Sig: Take 1 capsule (100 mg total) by mouth 2 (two) times daily.       Imaging & Referrals:  None         ID#[id number]         [1]   Current Outpatient Medications   Medication Sig Dispense Refill    Tirzepatide-Weight Management (ZEPBOUND) 2.5 MG/0.5ML Subcutaneous Solution Auto-injector Inject 2.5 mg into the skin once a week. 2 mL 0    docusate sodium (COLACE) 100 MG Oral Cap Take 1 capsule (100 mg total) by mouth 2 (two) times daily. 30 capsule 2    meclizine 25 MG Oral Tab Take 1 tablet (25 mg total) by mouth 3 (three) times daily as needed. 15 tablet 0    methylPREDNISolone (MEDROL) 4 MG Oral Tablet Therapy Pack Dosepack: take as directed 1 each 0    meclizine 25 MG Oral Tab Take 1 tablet (25 mg total) by mouth 3 (three) times daily as needed for Dizziness or Nausea. 20 tablet 0    ondansetron 4 MG Oral Tablet Dispersible Take 1 tablet (4 mg total) by mouth every 4 (four) hours as needed for Nausea. 10 tablet 0    Adalimumab (HUMIRA, 2 PEN,) 40 MG/0.4ML Subcutaneous Auto-injector Kit Inject 40 mg into the skin every 14 (fourteen) days. 2 each 5    predniSONE 5 MG Oral Tab Take 4 tablets (20 mg total) by mouth daily for 3 days, THEN 3 tablets (15 mg total) daily for 3 days, THEN 2 tablets (10 mg total) daily for 3 days, THEN 1 tablet (5 mg total) daily for 3 days, THEN 1 tablet (5 mg total) daily as needed. Take as instructed in the morning with breakfast for 12 days.  Then, after 12 days, only take prednisone up to 1-2 tablets daily as needed for breakthrough pain. 90 tablet 0    methotrexate 2.5 MG Oral Tab Take 7 tablets of Methotrexate 2.5 mg once weekly with food. 84 tablet 3    folic acid 1 MG Oral Tab Take 1 tablet (1 mg total) by mouth daily. 90 tablet 3    PARoxetine 30 MG Oral Tab Take 1 tablet (30 mg total) by mouth every morning. 90 tablet 1    hydrOXYzine 50 MG Oral Tab Take 1.5 tablets (75 mg total) by mouth nightly as needed for Anxiety (Can increase to 100 mg nightly if symptoms worsening/not  improving in 2-3 days). 30 tablet 0    MELOXICAM 15 MG Oral Tab TAKE 1 TABLET(15 MG) BY MOUTH DAILY 30 tablet 1    montelukast 10 MG Oral Tab Take 1 tablet (10 mg total) by mouth daily. 90 tablet 3    fluticasone propionate 50 MCG/ACT Nasal Suspension 2 sprays by Each Nare route daily. 1 each 0    cetirizine 10 MG Oral Tab Take 1 tablet (10 mg total) by mouth daily. 30 tablet 0    SYMBICORT 160-4.5 MCG/ACT Inhalation Aerosol Inhale 2 puffs into the lungs 2 (two) times daily. 10.2 g 2    albuterol 108 (90 Base) MCG/ACT Inhalation Aero Soln Inhale 2 puffs into the lungs every 4 (four) hours as needed for Wheezing. 18 g 2    Spacer/Aero-Holding Chambers Does not apply Device Use with inhaler as needed 1 each 0   [2] No Known Allergies

## 2025-05-14 ENCOUNTER — OFFICE VISIT (OUTPATIENT)
Dept: FAMILY MEDICINE CLINIC | Facility: CLINIC | Age: 50
End: 2025-05-14
Payer: MEDICAID

## 2025-05-14 ENCOUNTER — LAB ENCOUNTER (OUTPATIENT)
Dept: LAB | Facility: HOSPITAL | Age: 50
End: 2025-05-14
Payer: COMMERCIAL

## 2025-05-14 VITALS
RESPIRATION RATE: 18 BRPM | OXYGEN SATURATION: 97 % | WEIGHT: 238 LBS | HEART RATE: 83 BPM | HEIGHT: 64 IN | BODY MASS INDEX: 40.63 KG/M2 | DIASTOLIC BLOOD PRESSURE: 64 MMHG | SYSTOLIC BLOOD PRESSURE: 132 MMHG | TEMPERATURE: 98 F

## 2025-05-14 DIAGNOSIS — J02.0 STREP THROAT: Primary | ICD-10-CM

## 2025-05-14 DIAGNOSIS — E66.813 CLASS 3 SEVERE OBESITY WITH SERIOUS COMORBIDITY AND BODY MASS INDEX (BMI) OF 40.0 TO 44.9 IN ADULT, UNSPECIFIED OBESITY TYPE: ICD-10-CM

## 2025-05-14 DIAGNOSIS — R73.03 PREDIABETES: ICD-10-CM

## 2025-05-14 DIAGNOSIS — J02.9 SORE THROAT: ICD-10-CM

## 2025-05-14 LAB
ALBUMIN SERPL-MCNC: 4.4 G/DL (ref 3.2–4.8)
ALBUMIN/GLOB SERPL: 1.3 {RATIO} (ref 1–2)
ALP LIVER SERPL-CCNC: 86 U/L (ref 39–100)
ALT SERPL-CCNC: 21 U/L (ref 10–49)
ANION GAP SERPL CALC-SCNC: 6 MMOL/L (ref 0–18)
AST SERPL-CCNC: 16 U/L (ref ?–34)
BILIRUB SERPL-MCNC: 0.4 MG/DL (ref 0.3–1.2)
BUN BLD-MCNC: 11 MG/DL (ref 9–23)
BUN/CREAT SERPL: 12.1 (ref 10–20)
CALCIUM BLD-MCNC: 9 MG/DL (ref 8.7–10.4)
CHLORIDE SERPL-SCNC: 107 MMOL/L (ref 98–112)
CO2 SERPL-SCNC: 25 MMOL/L (ref 21–32)
CONTROL LINE PRESENT WITH A CLEAR BACKGROUND (YES/NO): YES YES/NO
CREAT BLD-MCNC: 0.91 MG/DL (ref 0.55–1.02)
EGFRCR SERPLBLD CKD-EPI 2021: 77 ML/MIN/1.73M2 (ref 60–?)
FASTING STATUS PATIENT QL REPORTED: YES
GLOBULIN PLAS-MCNC: 3.3 G/DL (ref 2–3.5)
GLUCOSE BLD-MCNC: 85 MG/DL (ref 70–99)
INSULIN SERPL-ACNC: 24.7 MU/L (ref 3–25)
KIT LOT #: ABNORMAL NUMERIC
OSMOLALITY SERPL CALC.SUM OF ELEC: 285 MOSM/KG (ref 275–295)
POTASSIUM SERPL-SCNC: 3.7 MMOL/L (ref 3.5–5.1)
PROT SERPL-MCNC: 7.7 G/DL (ref 5.7–8.2)
SODIUM SERPL-SCNC: 138 MMOL/L (ref 136–145)
STREP GRP A CUL-SCR: POSITIVE
TSI SER-ACNC: 0.93 UIU/ML (ref 0.55–4.78)

## 2025-05-14 PROCEDURE — 80053 COMPREHEN METABOLIC PANEL: CPT

## 2025-05-14 PROCEDURE — 83525 ASSAY OF INSULIN: CPT

## 2025-05-14 PROCEDURE — 99213 OFFICE O/P EST LOW 20 MIN: CPT | Performed by: NURSE PRACTITIONER

## 2025-05-14 PROCEDURE — 84443 ASSAY THYROID STIM HORMONE: CPT

## 2025-05-14 PROCEDURE — 87880 STREP A ASSAY W/OPTIC: CPT | Performed by: NURSE PRACTITIONER

## 2025-05-14 PROCEDURE — 36415 COLL VENOUS BLD VENIPUNCTURE: CPT

## 2025-05-14 RX ORDER — AMOXICILLIN 500 MG/1
500 CAPSULE ORAL 2 TIMES DAILY
Qty: 20 CAPSULE | Refills: 0 | Status: SHIPPED | OUTPATIENT
Start: 2025-05-14 | End: 2025-05-24

## 2025-05-14 NOTE — PROGRESS NOTES
Subjective:   Patient ID: Arielle Kidd is a 49 year old female.    Patient is a 49 year old female who presents today with complaints of sore throat, burning throat, painful swallowing x 2 days. Denies runny nose, nasal congestion, cough, ear pain, n/v/d, abdominal pain, headaches, dizziness, rashes or fevers. Tolerating PO well at home although it hurts to swallow. Attempted treatment prior to arrival = Tylenol (LD last night). No ill contacts she can identify.        History/Other:   Review of Systems   Constitutional:  Negative for appetite change and fever.   HENT:  Positive for sore throat. Negative for congestion, ear pain and rhinorrhea.    Respiratory:  Negative for cough.    Gastrointestinal:  Negative for abdominal pain, diarrhea, nausea and vomiting.   Skin:  Negative for rash.   Neurological:  Negative for dizziness and headaches.     Current Medications[1]  Allergies:Allergies[2]    /64   Pulse 83   Temp 97.9 °F (36.6 °C)   Resp 18   Ht 5' 4\" (1.626 m)   Wt 238 lb (108 kg)   SpO2 97%   BMI 40.85 kg/m²       Objective:   Physical Exam  Vitals reviewed.   Constitutional:       General: She is awake. She is not in acute distress.     Appearance: Normal appearance. She is well-developed and well-groomed. She is not ill-appearing, toxic-appearing or diaphoretic.   HENT:      Head: Normocephalic and atraumatic.      Right Ear: Tympanic membrane, ear canal and external ear normal.      Left Ear: Tympanic membrane, ear canal and external ear normal.      Nose: Nose normal.      Mouth/Throat:      Lips: Pink.      Mouth: Mucous membranes are moist. No oral lesions.      Pharynx: Oropharynx is clear. Uvula midline. Posterior oropharyngeal erythema present.      Tonsils: No tonsillar exudate. 2+ on the right. 2+ on the left.   Cardiovascular:      Rate and Rhythm: Normal rate and regular rhythm.   Pulmonary:      Effort: Pulmonary effort is normal. No respiratory distress.      Breath sounds:  Normal breath sounds and air entry. No decreased breath sounds, wheezing, rhonchi or rales.   Lymphadenopathy:      Head:      Right side of head: No tonsillar adenopathy.      Left side of head: No tonsillar adenopathy.      Cervical: No cervical adenopathy.   Skin:     General: Skin is warm and dry.   Neurological:      Mental Status: She is alert and oriented to person, place, and time.   Psychiatric:         Behavior: Behavior is cooperative.         Assessment & Plan:   1. Strep throat    2. Sore throat        Orders Placed This Encounter   Procedures    Strep A Assay W/Optic     Results for orders placed or performed in visit on 05/14/25   Strep A Assay W/Optic    Collection Time: 05/14/25 12:31 PM   Result Value Ref Range    Strep Grp A Screen positive Negative    Control Line Present with a clear background (yes/no) yes Yes/No    Kit Lot # [id number] Numeric    Kit Expiration Date 06/03/2026 Date         Meds This Visit:  Requested Prescriptions     Signed Prescriptions Disp Refills    amoxicillin 500 MG Oral Cap 20 capsule 0     Sig: Take 1 capsule (500 mg total) by mouth 2 (two) times daily for 10 days.     Reviewed POC test results with patient.  Reassuring physical exam findings. Vitals WNL. No sign of RDS or dehydration at this time.  START Amoxicillin today. Has tolerated well in the past.  Supportive care and return to care measures reviewed.  Patient v/u and is comfortable with this plan.    Patient Instructions   1. Take Amoxicillin antibiotic as directed.    2. You are still contagious for 24 hours following the first dose of antibiotics.    3. Perform good hand hygiene often.  4. Change your toothbrush in 2-3 days.  5. Follow up with primary care physician as needed, recommend a follow up within 2 weeks  6. You may use throat lozenges, acetaminophen for pain and fever.    7. Gargling with warm salt water may ease your pain for a few hours. You may also drink warmed or salty liquids such as broth, or  tea  8. Seek medical attention sooner for worsening of symptoms despite treatment efforts, or the emergency room for the following non inclusive list of symptoms: uncontrolled fever/pain, inability to keep fluids down, shortness of breath or respiratory distress            [1]   Current Outpatient Medications   Medication Sig Dispense Refill    amoxicillin 500 MG Oral Cap Take 1 capsule (500 mg total) by mouth 2 (two) times daily for 10 days. 20 capsule 0    Tirzepatide-Weight Management (ZEPBOUND) 2.5 MG/0.5ML Subcutaneous Solution Auto-injector Inject 2.5 mg into the skin once a week. 2 mL 0    docusate sodium (COLACE) 100 MG Oral Cap Take 1 capsule (100 mg total) by mouth 2 (two) times daily. 30 capsule 2    meclizine 25 MG Oral Tab Take 1 tablet (25 mg total) by mouth 3 (three) times daily as needed. 15 tablet 0    methylPREDNISolone (MEDROL) 4 MG Oral Tablet Therapy Pack Dosepack: take as directed 1 each 0    meclizine 25 MG Oral Tab Take 1 tablet (25 mg total) by mouth 3 (three) times daily as needed for Dizziness or Nausea. 20 tablet 0    ondansetron 4 MG Oral Tablet Dispersible Take 1 tablet (4 mg total) by mouth every 4 (four) hours as needed for Nausea. 10 tablet 0    Adalimumab (HUMIRA, 2 PEN,) 40 MG/0.4ML Subcutaneous Auto-injector Kit Inject 40 mg into the skin every 14 (fourteen) days. 2 each 5    predniSONE 5 MG Oral Tab Take 4 tablets (20 mg total) by mouth daily for 3 days, THEN 3 tablets (15 mg total) daily for 3 days, THEN 2 tablets (10 mg total) daily for 3 days, THEN 1 tablet (5 mg total) daily for 3 days, THEN 1 tablet (5 mg total) daily as needed. Take as instructed in the morning with breakfast for 12 days.  Then, after 12 days, only take prednisone up to 1-2 tablets daily as needed for breakthrough pain. 90 tablet 0    methotrexate 2.5 MG Oral Tab Take 7 tablets of Methotrexate 2.5 mg once weekly with food. 84 tablet 3    folic acid 1 MG Oral Tab Take 1 tablet (1 mg total) by mouth daily. 90  tablet 3    PARoxetine 30 MG Oral Tab Take 1 tablet (30 mg total) by mouth every morning. 90 tablet 1    hydrOXYzine 50 MG Oral Tab Take 1.5 tablets (75 mg total) by mouth nightly as needed for Anxiety (Can increase to 100 mg nightly if symptoms worsening/not improving in 2-3 days). 30 tablet 0    MELOXICAM 15 MG Oral Tab TAKE 1 TABLET(15 MG) BY MOUTH DAILY 30 tablet 1    montelukast 10 MG Oral Tab Take 1 tablet (10 mg total) by mouth daily. 90 tablet 3    fluticasone propionate 50 MCG/ACT Nasal Suspension 2 sprays by Each Nare route daily. 1 each 0    cetirizine 10 MG Oral Tab Take 1 tablet (10 mg total) by mouth daily. 30 tablet 0    SYMBICORT 160-4.5 MCG/ACT Inhalation Aerosol Inhale 2 puffs into the lungs 2 (two) times daily. 10.2 g 2    albuterol 108 (90 Base) MCG/ACT Inhalation Aero Soln Inhale 2 puffs into the lungs every 4 (four) hours as needed for Wheezing. 18 g 2    Spacer/Aero-Holding Chambers Does not apply Device Use with inhaler as needed 1 each 0   [2] No Known Allergies

## 2025-05-14 NOTE — PATIENT INSTRUCTIONS
1. Take Amoxicillin antibiotic as directed.    2. You are still contagious for 24 hours following the first dose of antibiotics.    3. Perform good hand hygiene often.  4. Change your toothbrush in 2-3 days.  5. Follow up with primary care physician as needed, recommend a follow up within 2 weeks  6. You may use throat lozenges, acetaminophen for pain and fever.    7. Gargling with warm salt water may ease your pain for a few hours. You may also drink warmed or salty liquids such as broth, or tea  8. Seek medical attention sooner for worsening of symptoms despite treatment efforts, or the emergency room for the following non inclusive list of symptoms: uncontrolled fever/pain, inability to keep fluids down, shortness of breath or respiratory distress

## 2025-05-19 ENCOUNTER — TELEPHONE (OUTPATIENT)
Dept: FAMILY MEDICINE CLINIC | Facility: CLINIC | Age: 50
End: 2025-05-19

## 2025-05-19 DIAGNOSIS — E66.813 CLASS 3 SEVERE OBESITY WITH SERIOUS COMORBIDITY AND BODY MASS INDEX (BMI) OF 40.0 TO 44.9 IN ADULT, UNSPECIFIED OBESITY TYPE: Primary | ICD-10-CM

## 2025-05-19 NOTE — TELEPHONE ENCOUNTER
Denied    Note from payer: Request Reference Number: [id number].  WEGOVY       INJ 0.25MG is denied for not meeting the prior authorization requirement(s).  Details of this decision are in the notice attached below or have been faxed to you.  Payer: [employer] [facility] Case ID: [id number]    [phone]    [phone]  Electronic appeal: Not supported  Appeal instructions: Appeals are not supported through ePA. Please refer to the fax case notice for appeals information and instructions.  View History    Awaiting denial letter

## 2025-05-19 NOTE — TELEPHONE ENCOUNTER
Class 3 severe obesity with serious comorbidity and body mass index (BMI) of 40.0 to 44.9 in adult, unspecified obesity type E66.813, Z68.41

## 2025-06-20 ENCOUNTER — PATIENT MESSAGE (OUTPATIENT)
Age: 50
End: 2025-06-20

## 2025-07-07 RX ORDER — PREDNISONE 5 MG/1
5 TABLET ORAL SEE ADMIN INSTRUCTIONS
Qty: 90 TABLET | Refills: 0 | Status: SHIPPED | OUTPATIENT
Start: 2025-07-07

## 2025-07-07 NOTE — TELEPHONE ENCOUNTER
Requested Prescriptions     Pending Prescriptions Disp Refills    PREDNISONE 5 MG Oral Tab [Pharmacy Med Name: PREDNISONE 5MG TABLETS] 90 tablet 0     Sig: SEE PRINTED INSTRUCTIONS     LOV: 4/16/25  Future Appointments   Date Time Provider Department Center   9/6/2025  8:00 AM Jenni Ramsay APRN [facility]   11/8/2025 11:00 AM Wilder Ward DO [facility]   1/15/2026  8:00 AM Kari Li APRN [id number]     Labs:   Component      Latest Ref Rng 4/16/2025 5/14/2025   WBC      4.0 - 11.0 x10(3) uL 7.6     RBC      3.80 - 5.30 x10(6)uL 3.84     Hemoglobin      12.0 - 16.0 g/dL 12.4     Hematocrit      35.0 - 48.0 % 35.5     MCV      80.0 - 100.0 fL 92.4     MCH      26.0 - 34.0 pg 32.3     MCHC      31.0 - 37.0 g/dL 34.9     RDW-SD      35.1 - 46.3 fL 45.3     RDW      11.0 - 15.0 % 13.9     Platelet Count      150.0 - 450.0 10(3)uL 225.0     Prelim Neutrophil Abs      1.50 - 7.70 x10 (3) uL 4.57     Neutrophils Absolute      1.50 - 7.70 x10(3) uL 4.57     Lymphocytes Absolute      1.00 - 4.00 x10(3) uL 1.94     Monocytes Absolute      0.10 - 1.00 x10(3) uL 0.81     Eosinophils Absolute      0.00 - 0.70 x10(3) uL 0.15     Basophils Absolute      0.00 - 0.20 x10(3) uL 0.07     Immature Granulocyte Absolute      0.00 - 1.00 x10(3) uL 0.03     Neutrophils %      % 60.4     Lymphocytes %      % 25.6     Monocytes %      % 10.7     Eosinophils %      % 2.0     Basophils %      % 0.9     Immature Granulocyte %      % 0.4     Glucose      70 - 99 mg/dL  85    Sodium      136 - 145 mmol/L  138    Potassium      3.5 - 5.1 mmol/L  3.7    Chloride      98 - 112 mmol/L  107    Carbon Dioxide, Total      21.0 - 32.0 mmol/L  25.0    ANION GAP      0 - 18 mmol/L  6    BUN      9 - 23 mg/dL  11    CREATININE      0.55 - 1.02 mg/dL 0.88  0.91    BUN/CREATININE RATIO      10.0 - 20.0   12.1    CALCIUM      8.7 - 10.4 mg/dL  9.0    CALCULATED OSMOLALITY      275 - 295 mOsm/kg  285    EGFR       >=60 mL/min/1.73m2 81  77    ALT (SGPT)      10 - 49 U/L 23  21    AST (SGOT)      <34 U/L 20  16    ALKALINE PHOSPHATASE      39 - 100 U/L  86    Total Bilirubin      0.3 - 1.2 mg/dL  0.4    PROTEIN, TOTAL      5.7 - 8.2 g/dL  7.7    Albumin      3.2 - 4.8 g/dL  4.4    Globulin      2.0 - 3.5 g/dL  3.3    A/G Ratio      1.0 - 2.0   1.3    Patient Fasting for CMP?  Yes    Quantiferon TB NIL      IU/mL 0.06     Quantiferon-TB1 Minus NIL      IU/mL 0.01     Quantiferon-TB2 Minus NIL      IU/mL 0.00     Quantiferon TB Mitogen minus NIL      IU/mL >10.00     Quantiferon TB Result      Negative  Negative     C-REACTIVE PROTEIN      <1.00 mg/dL 0.90     SED RATE      0 - 20 mm/Hr 45 (H)        Legend:  (H) High    PLAN:  - PA for adalimumab 40 mg every other week       We discussed the risks and benefit of anti-TNF therapy w/ adalimumab. The risks of the medication include immunosuppression, with increased risk for all infection, but primarily upper respiratory infections. Anti-TNF therapy also has an increased risk of malignancy, but it has been demonstrated that the continued pro-inflammatory state involved with autoimmune disease has an increased risk of malignancy left untreated. This agent is considered a biologic drug, and as such runs the risk of allergic reaction & injection site irritation. Other adverse effects include demyelinating disease. Additionally, discussed with patient that it may take about 3 months for this medication to become therapeutic. At this time, the patient has expressed agreement that the benefits of therapy outweigh the risks of drug administration.    - PO Prednisone taper x12d starting at 20 mg q AM. Afterwards, PRN Prednisone 5-10 mg q AM for breakthrough pain  -Dec Methotrexate as follows: methotrexate 2.5 mg tablets x 7  tabs qw with daily folic acid 1 mg. Side effects of MTX prev discussed including possible alopecia, hepatotoxicity, mucositis, GI upset, as well as immunosuppressive  effects.  Also, emphasized importance of avoiding EtOH while on MTX.  Explained that MTX may take several weeks to months for noticeable symptom improvement.  - Discussed with patient, given possible immunosuppressive effects of MTX, the importance of keeping vaccinations up-to-date.    - Consult/evaluation communicated with referring physician/provider.     I will notify patient pending PA for adalimumab.  Otherwise, discussed the patient that she is due for lab monitoring with plans for close lab monitoring going forward.  Patient to follow-up around October with repeat labs drawn prior as well.     Wilder Ward DO  4/16/2025   12:29 PM

## 2025-07-14 ENCOUNTER — TELEPHONE (OUTPATIENT)
Age: 50
End: 2025-07-14

## 2025-07-14 NOTE — TELEPHONE ENCOUNTER
PA start    Prior authorization for: [place]     Medication form: 40/0.4 ml pen     Submission method: [employer]     Tracking #:    QF-TB result:     Component      Latest Ref Rng 4/16/2025   Quantiferon TB NIL      IU/mL 0.06    Quantiferon-TB1 Minus NIL      IU/mL 0.01    Quantiferon-TB2 Minus NIL      IU/mL 0.00    Quantiferon TB Mitogen minus NIL      IU/mL >10.00    Quantiferon TB Result      Negative  Negative

## 2025-07-14 NOTE — TELEPHONE ENCOUNTER
Patient had and insurance change. Please advise.    PA Denied    Prior authorization for:  [employer]    Medication form: 40 mg/0.4 ml    Tracking #:    Denial reason: Patient has to try and fail 2 preferred DMARDs (methotrexate, leflunomide, hydroxychloroquine, sulfasalazine)    Next steps: Please advise.       Future Appointments   Date Time Provider Department Center   9/6/2025  8:00 AM Jenni Ramsay APRN [facility]   11/8/2025 11:00 AM Wilder Ward DO [facility]   1/15/2026  8:00 AM Kari Li APRN [id number]

## 2025-07-15 ENCOUNTER — TELEPHONE (OUTPATIENT)
Age: 50
End: 2025-07-15

## 2025-07-15 NOTE — TELEPHONE ENCOUNTER
Dr. Meneses- patient of Dr. Ward's pharmacy looking for clarification on prednisone dosing? That was sent in on 07/07/25.

## 2025-07-15 NOTE — TELEPHONE ENCOUNTER
Current Outpatient Medications   Medication Sig Dispense Refill    PREDNISONE 5 MG Oral Tab SEE PRINTED INSTRUCTIONS 90 tablet 0       Per pharmacy: please clarify, what directions is patient using now?

## 2025-07-16 NOTE — TELEPHONE ENCOUNTER
Called [facility] pharmacy, spoke to Elsie, pharmacist, clarified Prednisone dosing 5-10mg every morning as needed for break through pain.

## 2025-07-17 ENCOUNTER — VIRTUAL PHONE E/M (OUTPATIENT)
Age: 50
End: 2025-07-17
Payer: MEDICAID

## 2025-07-17 DIAGNOSIS — M05.9 SEROPOSITIVE RHEUMATOID ARTHRITIS (HCC): Primary | ICD-10-CM

## 2025-07-17 DIAGNOSIS — Z79.899 ENCOUNTER FOR LONG-TERM (CURRENT) USE OF HIGH-RISK MEDICATION: ICD-10-CM

## 2025-07-17 DIAGNOSIS — M06.4 INFLAMMATORY POLYARTHRITIS (HCC): ICD-10-CM

## 2025-07-17 PROCEDURE — G2252 BRIEF CHKIN BY MD/QHP, 11-20: HCPCS | Performed by: INTERNAL MEDICINE

## 2025-07-17 RX ORDER — HYDROXYCHLOROQUINE SULFATE 200 MG/1
400 TABLET, FILM COATED ORAL DAILY
Qty: 180 TABLET | Refills: 0 | Status: SHIPPED | OUTPATIENT
Start: 2025-07-17

## 2025-07-17 RX ORDER — NAPROXEN 500 MG/1
500 TABLET ORAL 2 TIMES DAILY WITH MEALS
Qty: 60 TABLET | Refills: 0 | Status: SHIPPED | OUTPATIENT
Start: 2025-07-17

## 2025-07-17 NOTE — PROGRESS NOTES
Virtual Telephone Check-In    Arielle Kidd verbally consents to a Virtual/Telephone Check-In visit on 07/17/25.  Patient has been referred to the [facility] website at [email or web address] to review the yearly Consent to Treat document.    Patient understands and accepts financial responsibility for any deductible, co-insurance and/or co-pays associated with this service.      RHEUMATOLOGY CLINIC- FOLLOW UP    Arielle Kidd is a 50 year old female.    ASSESSMENT/PLAN:       ICD-10-CM    1. Seropositive rheumatoid arthritis (HCC): +GRISELDA/RF/CCP  M05.9 Quantiferon TB Plus     ALT(SGPT)     AST (SGOT)     CBC With Differential With Platelet     Creatinine, Serum     C-Reactive Protein     Sed Rate, Westergren (Automated)      2. Inflammatory polyarthritis (HCC)  M06.4 Quantiferon TB Plus     ALT(SGPT)     AST (SGOT)     CBC With Differential With Platelet     Creatinine, Serum     C-Reactive Protein     Sed Rate, Westergren (Automated)      3. Gamma Gap  R79.9 Quantiferon TB Plus     ALT(SGPT)     AST (SGOT)     CBC With Differential With Platelet     Creatinine, Serum     C-Reactive Protein     Sed Rate, Westergren (Automated)      4. Family history of autoimmune disorder  Z83.2 Quantiferon TB Plus     ALT(SGPT)     AST (SGOT)     CBC With Differential With Platelet     Creatinine, Serum     C-Reactive Protein     Sed Rate, Westergren (Automated)      5. Encounter for long-term (current) use of high-risk medication  Z79.899 Quantiferon TB Plus     ALT(SGPT)     AST (SGOT)     CBC With Differential With Platelet     Creatinine, Serum     C-Reactive Protein     Sed Rate, Westergren (Automated)            DISCUSSION:  Patient presents for follow-up for seropositive RA (positive GRISELDA, CCP, RF, with periarticular osteopenia), initially doing better on methotrexate, however, has persistent symptoms currently despite max dose methotrexate at 10 tablets once weekly.  Therefore, discussed with patient risk and benefit of  escalation of therapy to immunologic/TNF inhibitor such as Humira to which she is agreeable.  Will look into PA.  Will prescribe p.o. prednisone taper in the meantime.  As patient did not feel better when going from methotrexate at 7 tablets once weekly to 10 tablets once weekly, she is requesting to decrease methotrexate back to 7 tablets once weekly which I do think is reasonable.  However, discussed with patient to increase back to 10 tablets once weekly if symptoms recur on lower dosing.        PLAN:  - Simlandi was denied.  They advised that she has to try at least 2 DMARDs.  - She is currently on methotrexate.  Recommend to continue methotrexate 7 pills weekly and folic acid daily  - She is also taking prednisone 5 to 10 mg a day for breakthrough pain  - Continues to have a lot of joint pain and swelling  - Plan add hydroxychloroquine 400 mg daily.  Risks/Benefits HCQ d/w patient which include: most common s/e are nausea and diarrhea, which improve over time. Less common s/e include rash, changes in skin pigment (such as darkening or dark spots), hair changes, muscle weakness and retinal toxicity. It is recommended that you have an eye exam within the first year of use, then repeat yearly.  - Will also add naproxen 500 mg once or twice a day for her pain      Meenakshi Meneses MD  7/16/2025   10:05 AM    There is a longitudinal care relationship with me, the care plan reflects the ongoing nature of the continuous relationship of care, and the medical record indicates that there is ongoing treatment of a serious/complex medical condition which I am currently managing.  is Applicable.     Discussed with patient that they are on chronic treatment with a high-risk medication that requires close lab monitoring for possible drug toxicity.  Additionally, discussed with patient give the possible immunosuppressive effects of their medication as well as their underlying hyper-inflammatory state, the importance of  keeping vaccinations and age-appropriate screenings up-to-date, given increased risk of complications and malignancies seen in these patient populations.  Patient to f/u with repeat labs drawn prior (standing labs ordered).  Last QuantiFERON TB testing: 3/15/2024 >reordered  Last Hepatitis testing: 3/15/2024        SUBJECTIVE:     7/17/2025:  Telephone visit done today.  Patient was notified that Simlandi was denied.  She has to try DMARDs.  Currently on methotrexate.  Continues to have diffuse joint pain.  Discussed starting Plaquenil.    4/16/25 Follow-Up: Patient with persistent symptoms despite increasing methotrexate at her last appointment.  Especially notes arthralgias with swelling when trying avoid prednisone.  Finds prednisone 5 mg daily less helpful than higher doses.        Current Medications:  Methotrexate 2.5 mg tablets x 10 tablets once weekly with folic acid 1 mg daily-started at 5 tablets once weekly 4/10/2024. Inc to 7 qw 6/2024, however, off for about 3w in Sept 2024 given insurance    PRN P.o. prednisone 5 mg daily-prednisone responsive, most response to higher doses.    Medication History:  Prednisone-responsive, patient previously treated for asthma flares with improvement of her arthralgias    PRN Ibuprofen, Naproxen, Tylenol- Ineffective    Interval History:     3/14/24 Initial Consult: I had the pleasure of seeing Arielle Kidd on 3/15/2024 as a new outpatient consultation for positive RF serologies. The patient was originally referred by LISS Ramsay.     48 year old female w/ PMH asthma, goiter who presents to clinic today. Patient originally evaluated at her primary care office for arthralgias/myalgias worsening since December 2023.  She describes back, knee, hand pain with associated swelling described as an aching pain that is worse even while resting.  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  She has AM stiffness for about 1 hour.  Also reports generalized fatigue.  Symptoms are worse at night and patient attempting to treat with as needed ibuprofen, naproxen, Tylenol.  ROS negative for unexplained fever, chills, unintentional weight loss, Raynaud's phenomenon, sicca symptoms (however, patient notes recent complicated dental history given weak and broken teeth), hematuria.  She notes an itchy rash alongside her chest that she first noted during the summertime that comes and goes.  She has a strong family history of autoimmune diseases including a mother with SLE/RA, maternal uncle with SLE, maternal aunts with SLE, and a maternal grandmother with RA.  Interestingly, patient was treated for an asthma exacerbation October 2023 with prednisone 80 mg daily with significant improvement of her symptoms.    4/10/24 virtual Follow-Up: Patient reporting significant improvement of symptoms since the p.o. prednisone taper, especially so at the higher doses.  Lowest effective dose appears to be around 10 mg daily with recurrence of symptoms around 5 mg daily.  She presents virtually today to discuss recent lab work and imaging.    6/20/24 Follow-Up: Patient recently had dental surgery related to dentures and is on antibiotics so held her last dose of methotrexate.  Otherwise, notes overall improvement in her arthralgias since starting methotrexate.  She did hold her prednisone dose for a couple days given her mouth soreness but noted the arthralgias did not flareup significantly.      10/11/24 Follow-Up: Patient lost insurance last month so was off methotrexate for about 3 weeks.  Did experience a flare of her arthralgias with joint swelling around this time as well.  Resumed 10/1 with improvement.  However, does continue on prednisone daily.    12/19/24 Follow-Up: Patient has been trying to minimize her prednisone use with last dose about 1 month ago, however, notes about 4 days a week that  prednisone would likely be helpful given worsening of her arthralgias.  Unfortunately, ran out of methotrexate about 2 weeks ago so that was her last dose.  Continues to have arthralgias.  HISTORY:  Past Medical History:    Anxiety    Arthritis    Asthma (HCC)    Vertigo      Social Hx Reviewed   Family Hx Reviewed     Medications (Active prior to today's visit):  Current Outpatient Medications   Medication Sig Dispense Refill    PREDNISONE 5 MG Oral Tab SEE PRINTED INSTRUCTIONS 90 tablet 0    docusate sodium (COLACE) 100 MG Oral Cap Take 1 capsule (100 mg total) by mouth 2 (two) times daily. 30 capsule 2    meclizine 25 MG Oral Tab Take 1 tablet (25 mg total) by mouth 3 (three) times daily as needed. 15 tablet 0    methylPREDNISolone (MEDROL) 4 MG Oral Tablet Therapy Pack Dosepack: take as directed 1 each 0    meclizine 25 MG Oral Tab Take 1 tablet (25 mg total) by mouth 3 (three) times daily as needed for Dizziness or Nausea. 20 tablet 0    Adalimumab (HUMIRA, 2 PEN,) 40 MG/0.4ML Subcutaneous Auto-injector Kit Inject 40 mg into the skin every 14 (fourteen) days. 2 each 5    methotrexate 2.5 MG Oral Tab Take 7 tablets of Methotrexate 2.5 mg once weekly with food. 84 tablet 3    folic acid 1 MG Oral Tab Take 1 tablet (1 mg total) by mouth daily. 90 tablet 3    PARoxetine 30 MG Oral Tab Take 1 tablet (30 mg total) by mouth every morning. 90 tablet 1    hydrOXYzine 50 MG Oral Tab Take 1.5 tablets (75 mg total) by mouth nightly as needed for Anxiety (Can increase to 100 mg nightly if symptoms worsening/not improving in 2-3 days). 30 tablet 0    MELOXICAM 15 MG Oral Tab TAKE 1 TABLET(15 MG) BY MOUTH DAILY 30 tablet 1    montelukast 10 MG Oral Tab Take 1 tablet (10 mg total) by mouth daily. 90 tablet 3    fluticasone propionate 50 MCG/ACT Nasal Suspension 2 sprays by Each Nare route daily. 1 each 0    cetirizine 10 MG Oral Tab Take 1 tablet (10 mg total) by mouth daily. 30 tablet 0    SYMBICORT 160-4.5 MCG/ACT Inhalation  Aerosol Inhale 2 puffs into the lungs 2 (two) times daily. 10.2 g 2    albuterol 108 (90 Base) MCG/ACT Inhalation Aero Soln Inhale 2 puffs into the lungs every 4 (four) hours as needed for Wheezing. 18 g 2    Spacer/Aero-Holding Chambers Does not apply Device Use with inhaler as needed 1 each 0       Allergies:  No Known Allergies      ROS:   Review of Systems   Constitutional:  Negative for chills, fatigue and fever.   HENT:  Negative for congestion, hearing loss, mouth sores, nosebleeds and trouble swallowing.    Eyes:  Negative for photophobia, pain, redness and visual disturbance.   Respiratory:  Negative for cough and shortness of breath.    Cardiovascular:  Negative for chest pain, palpitations and leg swelling.   Gastrointestinal:  Negative for abdominal pain, blood in stool, diarrhea and nausea.   Endocrine: Negative for cold intolerance and heat intolerance.   Genitourinary:  Negative for dysuria, frequency and hematuria.   Musculoskeletal:  Positive for arthralgias and joint swelling. Negative for back pain, gait problem, myalgias, neck pain and neck stiffness.   Skin:  Negative for color change and rash.   Neurological:  Negative for dizziness, weakness, numbness and headaches.   Psychiatric/Behavioral:  Negative for confusion and dysphoric mood.         PHYSICAL EXAM:     Constitutional:  Well developed, Well nourished, No acute distress  HENT:  Normocephalic, Atraumatic, Bilateral external ears normal, Oropharynx moist, No oral exudates.  Neck: Normal range of motion, No tenderness, Supple, No stridor.  Eyes:  PERRL, EOMI, Conjunctiva normal, No discharge.  Respiratory:  Normal breath sounds, No respiratory distress, No wheezing.  Cardiovascular:  Normal heart rate, Normal rhythm, No murmurs, No rubs, No gallops.  GI:  Bowel sounds normal, Soft, No tenderness, No masses, No pulsatile masses.  : No CVA tenderness.  Musculoskeletal:  Good range of motion in all major joints. A comprehensive 28 count  joint exam was done and was negative for swelling or tenderness except as noted. Inspections for misalignment, asymmetry, crepitation, defects, tenderness, masses, nodules, effusions, range of motion, and stability in the upper and lower extremities bilaterally are all normal unless noted.      There is currently no information documented on the [facility]. Go to the Rheumatology activity and complete the [facility] joint exam.     Integument:  Warm, Dry, No erythema, No rash.  Lymphatic:  No lymphadenopathy noted.  Neurologic:  Alert & oriented x 3, Normal motor function, Normal sensory function, No focal deficits noted.  Psychiatric:  Affect normal, Judgment normal, Mood normal.     LABS:     Prior lab work reviewed and notable for:    10/4/2024:  CMP with BUN 9, CR 0.94, LFTs are elevated, no gamma gap noted    10/2/2024:  CMP with BUN 10, CR 0.9, LFTs nonelevated  CBC without cytopenias or leukocytosis    24:  CBC without cytopenias or leukocytosis  CRP less than 0.4 WNL, ESR 53 (high)  ALT 16 WNL, AST 15 WNL, CR 0.93 WNL    3/15/2024:  GRISELDA 1: 320 speckled with reflex antibodies negative  dsDNA by IFA less than 10 WNL, dsDNA 2.3 WNL  C4 26.3 WNL, C3 158.6 WNL  CRP less than 0.4 WNL, ESR 24 (high)  CCP greater than 340 (high)    Acute hepatitis panel and TB testing negative    CBC without cytopenias nor leukocytosis  ALT 13 WNL, AST 15 WNL, CR 0.95 WNL  UA with negative blood/protein, 500 leuk esterase but only 6/10 WBC  Monoclonal protein study without evidence of monoclonal protein      2024:  CTD screen by [facility] equivocal    10/16/2023:  TSH 1.030  CMP with normal renal and hepatic function, gamma gap noted  CBC without cytopenias nor leukocytosis  RF 81 (high)  CRP less than 0.4  Imagin/20/24 CXR:     Impression   CONCLUSION:  1. No acute cardiopulmonary disease       3/15/2024 knee x-rays:  L Knee Impression      No acute fracture or dislocation.  No joint effusion.     Mild periarticular  osteopenia, nonspecific, can be seen in inflammatory arthropathy.  No joint space narrowing or osteophyte formation.     Soft tissues are unremarkable.          R Knee Impression      No acute fracture or dislocation.  No joint effusion.     Mild peritoneal osteopenia, nonspecific, can be seen with inflammatory arthropathy.  No significant joint space narrowing or osteophyte formation.     Soft tissues are unremarkable.

## 2025-08-27 ENCOUNTER — OFFICE VISIT (OUTPATIENT)
Dept: FAMILY MEDICINE CLINIC | Facility: CLINIC | Age: 50
End: 2025-08-27

## 2025-08-27 VITALS
WEIGHT: 240 LBS | SYSTOLIC BLOOD PRESSURE: 131 MMHG | TEMPERATURE: 99 F | BODY MASS INDEX: 40.97 KG/M2 | OXYGEN SATURATION: 97 % | HEIGHT: 64 IN | HEART RATE: 102 BPM | DIASTOLIC BLOOD PRESSURE: 84 MMHG

## 2025-08-27 DIAGNOSIS — Z12.31 ENCOUNTER FOR SCREENING MAMMOGRAM FOR MALIGNANT NEOPLASM OF BREAST: ICD-10-CM

## 2025-08-27 DIAGNOSIS — J45.20 MILD INTERMITTENT ASTHMA, UNSPECIFIED WHETHER COMPLICATED (HCC): ICD-10-CM

## 2025-08-27 DIAGNOSIS — M05.79 RHEUMATOID ARTHRITIS INVOLVING MULTIPLE SITES WITH POSITIVE RHEUMATOID FACTOR (HCC): ICD-10-CM

## 2025-08-27 DIAGNOSIS — Z12.11 COLON CANCER SCREENING: Primary | ICD-10-CM

## 2025-08-27 DIAGNOSIS — E66.813 CLASS 3 SEVERE OBESITY WITH SERIOUS COMORBIDITY AND BODY MASS INDEX (BMI) OF 40.0 TO 44.9 IN ADULT, UNSPECIFIED OBESITY TYPE: ICD-10-CM

## 2025-08-27 PROCEDURE — 99214 OFFICE O/P EST MOD 30 MIN: CPT

## 2025-08-27 RX ORDER — FLUTICASONE PROPIONATE 50 MCG
2 SPRAY, SUSPENSION (ML) NASAL DAILY
Qty: 1 EACH | Refills: 2 | Status: SHIPPED | OUTPATIENT
Start: 2025-08-27

## 2025-08-27 RX ORDER — ALBUTEROL SULFATE 90 UG/1
2 INHALANT RESPIRATORY (INHALATION) EVERY 4 HOURS PRN
Qty: 18 G | Refills: 2 | Status: SHIPPED | OUTPATIENT
Start: 2025-08-27

## 2025-08-27 RX ORDER — PHENTERMINE HYDROCHLORIDE 30 MG/1
30 CAPSULE ORAL EVERY MORNING
Qty: 30 CAPSULE | Refills: 0 | Status: SHIPPED | OUTPATIENT
Start: 2025-08-27

## 2025-08-27 RX ORDER — BUDESONIDE AND FORMOTEROL FUMARATE DIHYDRATE 160; 4.5 UG/1; UG/1
2 AEROSOL RESPIRATORY (INHALATION) 2 TIMES DAILY
Qty: 10.2 G | Refills: 2 | Status: SHIPPED | OUTPATIENT
Start: 2025-08-27

## (undated) NOTE — LETTER
Date & Time: 5/7/2025, 6:57 PM  Patient: Arielle Kidd  Encounter Provider(s):    Samantha Barksdale APRN       To Whom It May Concern:    Arielle Kidd was seen and treated in our department on 5/7/2025. She should not return to work until symptoms are improving .    If you have any questions or concerns, please do not hesitate to call.        _____________________________  Physician/APC Signature

## (undated) NOTE — LETTER
ASTHMA ACTION PLAN for Estefany Ramos     : 1975     Date: 10/16/23  Doctor:  LISS Penn  Phone for doctor or clinic: [facility], [place], [place]  [address]  [address] [place] [id number] [phone]           ACT Goal: 20 or greater    Call your provider if you require your rescue/quick reliever medication more than 2-3 times in a 24 hour period. If you require your rescue inhaler/medication more than 2-3 times weekly, your asthma may not be under proper control and you should seek medical attention. *Quick Relievers are Xopenex and Albuterol*    You can use the colors of a traffic light to help learn about your asthma medicines. Year Round       1. Green - Go! % of Personal Best Peak Flow   Use controller medicine. Breathing is good  No cough or wheeze  Can work and play Medicine How much to take When to take it    Medications       Leukotriene Modulators Instructions     montelukast 10 MG Oral Tab    Take 1 tablet (10 mg total) by mouth daily. Sympathomimetics Instructions     albuterol 108 (90 Base) MCG/ACT Inhalation Aero Soln    Inhale 2 puffs into the lungs every 4 (four) hours as needed. SYMBICORT 160-4.5 MCG/ACT Inhalation Aerosol    Inhale 2 puffs into the lungs 2 (two) times daily. albuterol 108 (90 Base) MCG/ACT Inhalation Aero Soln    Inhale 2 puffs into the lungs every 4 (four) hours as needed for Wheezing. 2. Yellow - Caution. 50-79% Personal Best Peak Flow  Use reliever medicine to keep an asthma attack from getting bad. Cough  Quick Relievers  Wheezing  Tight Chest  Wake up at night Medicine How much to take When to take it    If symptoms are not improving in 24-48 hrs, call office for further instructions  Medications       Leukotriene Modulators Instructions     montelukast 10 MG Oral Tab    Take 1 tablet (10 mg total) by mouth daily.       Sympathomimetics Instructions     albuterol 108 (90 Base) MCG/ACT Inhalation Aero Soln    Inhale 2 puffs into the lungs every 4 (four) hours as needed. SYMBICORT 160-4.5 MCG/ACT Inhalation Aerosol    Inhale 2 puffs into the lungs 2 (two) times daily. albuterol 108 (90 Base) MCG/ACT Inhalation Aero Soln    Inhale 2 puffs into the lungs every 4 (four) hours as needed for Wheezing. 3. Red - Stop! Danger! <50% Personal Best Peak Flow  Continue Controller Medications But ADD:   Medicine not helping  Breathing is hard and fast  Nose opens wide  Can't walk  Ribs show  Can't talk well Medicine How much to take When to take it    If your symptoms do not improve in ONE hour -  go to the emergency room or call 911 immediately! If symptoms improve, call office for appointment immediately. Albuterol inhaler 2 puffs every 20 minutes for three treatments and Albuterol nebulizer 1 vial (2.5mg/3ml) every 20 minutes for two treatments       Don't forget:  Rinse mouth after using inhaler  Use spacer for inhaler  Remember to get your Flu vaccine every fall! [x] Asthma Action Plan reviewed with the caregiver and patient, and a copy of the plan was given to the patient/caregiver. [] Asthma Action Plan reviewed with the caregiver and patient on the phone, and copy mailed to patient/caregiver or sent via [email or web address]      Signatures:   Provider  LISS Swenson Patient  Christian Castillo

## (undated) NOTE — LETTER
5/7/2025          To Whom It May Concern:    Arielle Kidd is currently under my medical care for a Rheumatologic condition. She requires workplace accommodations, as described below.     Activity is restricted as follows: no lifting over 20 lbs..    If you require additional information please contact our office.        Sincerely,    Wilder Ward DO          Document generated by:  Wilder Ward DO

## (undated) NOTE — LETTER
Date & Time: 3/3/2022, 22:65 AM  Patient: Grecia Thompson  Encounter Provider(s):    Magnolia Mendoza MD       To Whom It May Concern:    Bessie Mondragon was seen and treated in our department on 3/3/2022. She is excused from work for 2 days.     If you have any questions or concerns, please do not hesitate to call.        _____________________________  Physician/APC Signature

## (undated) NOTE — LETTER
10/16/2023          To Whom It May Concern:    Trudy Loyola is currently under my medical care and may not return to work at this time. Please excuse Macie Brock for 3 days (45/31/[id number]/88)  She may return to work on 10/19/23. Activity is restricted as follows: none. If you require additional information please contact our office.         Sincerely,      LISS Major          Document generated by:  LISS Major

## (undated) NOTE — LETTER
Date & Time: 5/7/2025, 6:56 PM  Patient: Arielle Kidd  Encounter Provider(s):    Samantha Barksdale APRN       To Whom It May Concern:    Arielle Kidd was seen and treated in our department on 5/7/2025. She should not return to work until   .    If you have any questions or concerns, please do not hesitate to call.        _____________________________  Physician/APC Signature

## (undated) NOTE — LETTER
5/12/2025              Arielle Kidd        [address] [place] [address]         Dear Arielle Guzmán is currently under my medical care and may  return to work at this time.    She may return to work on 5/12/25.  Activity is restricted as follows: light duty and no lifting until further notice. Scheduled for follow-up in 3 months           Sincerely,      LISS Bae          Document electronically generated by:  LISS Bae